# Patient Record
Sex: FEMALE | Race: WHITE | NOT HISPANIC OR LATINO | Employment: FULL TIME | URBAN - METROPOLITAN AREA
[De-identification: names, ages, dates, MRNs, and addresses within clinical notes are randomized per-mention and may not be internally consistent; named-entity substitution may affect disease eponyms.]

---

## 2023-01-16 ENCOUNTER — HOSPITAL ENCOUNTER (EMERGENCY)
Facility: HOSPITAL | Age: 23
Discharge: HOME | End: 2023-01-16
Attending: EMERGENCY MEDICINE | Admitting: EMERGENCY MEDICINE
Payer: COMMERCIAL

## 2023-01-16 VITALS
RESPIRATION RATE: 18 BRPM | WEIGHT: 169.31 LBS | HEART RATE: 87 BPM | BODY MASS INDEX: 31.97 KG/M2 | TEMPERATURE: 98.7 F | DIASTOLIC BLOOD PRESSURE: 83 MMHG | OXYGEN SATURATION: 99 % | HEIGHT: 61 IN | SYSTOLIC BLOOD PRESSURE: 119 MMHG

## 2023-01-16 DIAGNOSIS — L02.412 ABSCESS OF LEFT AXILLA: Primary | ICD-10-CM

## 2023-01-16 PROCEDURE — 99282 EMERGENCY DEPT VISIT SF MDM: CPT

## 2023-01-16 RX ORDER — CLINDAMYCIN PHOSPHATE 11.9 MG/ML
SOLUTION TOPICAL 2 TIMES DAILY
Qty: 60 ML | Refills: 0 | Status: SHIPPED | OUTPATIENT
Start: 2023-01-16 | End: 2023-03-18

## 2023-01-16 RX ORDER — CEPHALEXIN 500 MG/1
500 CAPSULE ORAL 4 TIMES DAILY
Qty: 28 CAPSULE | Refills: 0 | Status: SHIPPED | OUTPATIENT
Start: 2023-01-16 | End: 2023-01-23

## 2023-01-16 NOTE — DISCHARGE INSTRUCTIONS
Take medications as directed.  Follow up with your PCP and seek referral to surgeon for definitive treatment. Return to ED for new/worsening symptoms.

## 2023-01-16 NOTE — ED PROVIDER NOTES
"Time: 9:29 AM EST  Date of encounter:  1/16/2023  Independent Historian/Clinical History and Information was obtained by:   Patient  Chief Complaint: boil left armpit    History is limited by: N/A    History of Present Illness:  Patient is a 23 y.o. year old female who presents to the emergency department for evaluation of boil to left axilla. States noticed 3 days ago and now is more swollen and painful. States has hx of the same, but has never sought treatment. Pt has appointment with her PCP today. Denies fevers, chills.       Patient Care Team  Primary Care Provider: Chavez Pearson MD    Past Medical History:     No Known Allergies  History reviewed. No pertinent past medical history.  History reviewed. No pertinent surgical history.  History reviewed. No pertinent family history.    Home Medications:  Prior to Admission medications    Not on File        Social History:   Social History     Tobacco Use   • Smoking status: Every Day     Packs/day: 0.50     Types: Cigarettes   Vaping Use   • Vaping Use: Every day   • Substances: Nicotine, Flavoring   Substance Use Topics   • Alcohol use: Never   • Drug use: Not Currently     Types: Methamphetamines, IV     Comment: former user last use 5/3/22         Review of Systems:  Review of Systems     Physical Exam:  /83   Pulse 87   Temp 98.7 °F (37.1 °C) (Oral)   Resp 18   Ht 154.9 cm (61\")   Wt 76.8 kg (169 lb 5 oz)   LMP 01/16/2023   SpO2 99%   BMI 31.99 kg/m²     Physical Exam  Vitals and nursing note reviewed.   Constitutional:       Appearance: Normal appearance.   HENT:      Head: Normocephalic and atraumatic.   Eyes:      Pupils: Pupils are equal, round, and reactive to light.   Pulmonary:      Effort: Pulmonary effort is normal.   Musculoskeletal:      Cervical back: Normal range of motion.   Skin:     General: Skin is warm and dry.      Comments: Abscess noted to left axilla with tenderness, swelling, erythema, and fluctuance   Neurological:      " Mental Status: She is alert.   Psychiatric:         Mood and Affect: Mood normal.         Behavior: Behavior normal.                  Procedures:  Incision & Drainage    Date/Time: 1/16/2023 10:05 AM  Performed by: Chaya Christie APRN  Authorized by: Osman Ferrara DO     Consent:     Consent obtained:  Verbal    Consent given by:  Patient    Risks discussed:  Pain, bleeding and incomplete drainage  Universal protocol:     Patient identity confirmed:  Verbally with patient  Location:     Type:  Abscess    Location:  Upper extremity    Upper extremity location: left axilla.  Pre-procedure details:     Skin preparation:  Povidone-iodine  Sedation:     Sedation type:  None  Anesthesia:     Anesthesia method:  Local infiltration    Local anesthetic:  Lidocaine 1% w/o epi  Procedure type:     Complexity:  Simple  Procedure details:     Ultrasound guidance: no      Needle aspiration: no      Incision types:  Single straight    Wound management:  Probed and deloculated    Drainage:  Purulent    Drainage amount:  Moderate    Wound treatment:  Wound left open  Post-procedure details:     Procedure completion:  Tolerated well, no immediate complications          Medical Decision Making:      Comorbidities that affect care:    None    External Notes reviewed:    None      The following orders were placed and all results were independently analyzed by me:  Orders Placed This Encounter   Procedures   • Incision & Drainage       Medications Given in the Emergency Department:  Medications - No data to display     ED Course:         Labs:    Lab Results (last 24 hours)     ** No results found for the last 24 hours. **           Imaging:    No Radiology Exams Resulted Within Past 24 Hours      Differential Diagnosis and Discussion:    none        [facility]         Patient Care Considerations:    LABS: I considered ordering labs, however pt non-toxic appearing      Consultants/Shared Management Plan:    None    Social Determinants of  Health:    Patient is independent, reliable, and has access to care.       Disposition and Care Coordination:    Discharged: The patient is suitable and stable for discharge with no need for consideration of observation or admission.      I have explained discharge medications and the need for follow up with the patient/caretakers. This was also printed in the discharge instructions. Patient was discharged with the following medications and follow up:      Medication List      New Prescriptions    cephalexin 500 MG capsule  Commonly known as: KEFLEX  Take 1 capsule by mouth 4 (Four) Times a Day for 7 days.     clindamycin 1 % external solution  Commonly known as: Cleocin-T  Apply  topically to the appropriate area as directed 2 (Two) Times a Day.           Where to Get Your Medications      These medications were sent to [facility] - [place] [id number] [address]    Hours: Mon-Fri 9:00AM-7:30PM Saturday 9:00AM-2:00PM Phone: [phone]   · cephalexin 500 MG capsule  · clindamycin 1 % external solution      Chavez Pearson MD  [address] [facility]  [address]  [place] [address]  [phone]    Today         Final diagnoses:   Abscess of left axilla        ED Disposition     ED Disposition   Discharge    Condition   Stable    Comment   --             This medical record created using voice recognition software.           Chaya Christie, APRN  01/16/23 7500

## 2023-02-15 ENCOUNTER — TELEPHONE (OUTPATIENT)
Dept: SURGERY | Facility: CLINIC | Age: 23
End: 2023-02-15

## 2023-02-15 NOTE — TELEPHONE ENCOUNTER
SPOKE TO DALILA AT [facility]S OFFICE TO INFORM PT CX NEW PT APPT WITH DR. SEGURA FROM 2/15/MS

## 2023-03-20 ENCOUNTER — APPOINTMENT (OUTPATIENT)
Dept: ULTRASOUND IMAGING | Facility: HOSPITAL | Age: 23
End: 2023-03-20
Payer: COMMERCIAL

## 2023-03-20 ENCOUNTER — HOSPITAL ENCOUNTER (EMERGENCY)
Facility: HOSPITAL | Age: 23
Discharge: HOME | End: 2023-03-20
Attending: EMERGENCY MEDICINE | Admitting: EMERGENCY MEDICINE
Payer: COMMERCIAL

## 2023-03-20 VITALS
DIASTOLIC BLOOD PRESSURE: 80 MMHG | BODY MASS INDEX: 35.96 KG/M2 | HEART RATE: 106 BPM | TEMPERATURE: 98 F | HEIGHT: 61 IN | WEIGHT: 190.48 LBS | OXYGEN SATURATION: 99 % | SYSTOLIC BLOOD PRESSURE: 122 MMHG | RESPIRATION RATE: 16 BRPM

## 2023-03-20 DIAGNOSIS — O20.0 THREATENED MISCARRIAGE IN EARLY PREGNANCY: Primary | ICD-10-CM

## 2023-03-20 LAB
ABO GROUP BLD: NORMAL
BACTERIA UR QL AUTO: ABNORMAL /HPF
BASOPHILS # BLD AUTO: 0.03 10*3/MM3 (ref 0–0.2)
BASOPHILS NFR BLD AUTO: 0.3 % (ref 0–1.5)
BILIRUB UR QL STRIP: NEGATIVE
BLD GP AB SCN SERPL QL: NEGATIVE
CLARITY UR: CLEAR
COLOR UR: YELLOW
DEPRECATED RDW RBC AUTO: 39.8 FL (ref 37–54)
EOSINOPHIL # BLD AUTO: 0.14 10*3/MM3 (ref 0–0.4)
EOSINOPHIL NFR BLD AUTO: 1.3 % (ref 0.3–6.2)
ERYTHROCYTE [DISTWIDTH] IN BLOOD BY AUTOMATED COUNT: 12.4 % (ref 12.3–15.4)
GLUCOSE UR STRIP-MCNC: NEGATIVE MG/DL
HCG INTACT+B SERPL-ACNC: 211.4 MIU/ML
HCT VFR BLD AUTO: 41.1 % (ref 34–46.6)
HGB BLD-MCNC: 14 G/DL (ref 12–15.9)
HGB UR QL STRIP.AUTO: ABNORMAL
HOLD SPECIMEN: NORMAL
HOLD SPECIMEN: NORMAL
HYALINE CASTS UR QL AUTO: ABNORMAL /LPF
IMM GRANULOCYTES # BLD AUTO: 0.03 10*3/MM3 (ref 0–0.05)
IMM GRANULOCYTES NFR BLD AUTO: 0.3 % (ref 0–0.5)
KETONES UR QL STRIP: NEGATIVE
LEUKOCYTE ESTERASE UR QL STRIP.AUTO: NEGATIVE
LYMPHOCYTES # BLD AUTO: 3.28 10*3/MM3 (ref 0.7–3.1)
LYMPHOCYTES NFR BLD AUTO: 30.5 % (ref 19.6–45.3)
MCH RBC QN AUTO: 29.7 PG (ref 26.6–33)
MCHC RBC AUTO-ENTMCNC: 34.1 G/DL (ref 31.5–35.7)
MCV RBC AUTO: 87.1 FL (ref 79–97)
MONOCYTES # BLD AUTO: 0.6 10*3/MM3 (ref 0.1–0.9)
MONOCYTES NFR BLD AUTO: 5.6 % (ref 5–12)
NEUTROPHILS NFR BLD AUTO: 6.69 10*3/MM3 (ref 1.7–7)
NEUTROPHILS NFR BLD AUTO: 62 % (ref 42.7–76)
NITRITE UR QL STRIP: NEGATIVE
NRBC BLD AUTO-RTO: 0 /100 WBC (ref 0–0.2)
NUMBER OF DOSES: NORMAL
PH UR STRIP.AUTO: 6.5 [PH] (ref 5–8)
PLATELET # BLD AUTO: 278 10*3/MM3 (ref 140–450)
PMV BLD AUTO: 9.7 FL (ref 6–12)
PROT UR QL STRIP: NEGATIVE
RBC # BLD AUTO: 4.72 10*6/MM3 (ref 3.77–5.28)
RBC # UR STRIP: ABNORMAL /HPF
REF LAB TEST METHOD: ABNORMAL
RH BLD: POSITIVE
SP GR UR STRIP: 1.02 (ref 1–1.03)
SQUAMOUS #/AREA URNS HPF: ABNORMAL /HPF
UROBILINOGEN UR QL STRIP: ABNORMAL
WBC # UR STRIP: ABNORMAL /HPF
WBC NRBC COR # BLD: 10.77 10*3/MM3 (ref 3.4–10.8)
WHOLE BLOOD HOLD COAG: NORMAL
WHOLE BLOOD HOLD SPECIMEN: NORMAL

## 2023-03-20 PROCEDURE — 85025 COMPLETE CBC W/AUTO DIFF WBC: CPT

## 2023-03-20 PROCEDURE — 86900 BLOOD TYPING SEROLOGIC ABO: CPT | Performed by: REGISTERED NURSE

## 2023-03-20 PROCEDURE — 99284 EMERGENCY DEPT VISIT MOD MDM: CPT

## 2023-03-20 PROCEDURE — 86901 BLOOD TYPING SEROLOGIC RH(D): CPT | Performed by: REGISTERED NURSE

## 2023-03-20 PROCEDURE — 86850 RBC ANTIBODY SCREEN: CPT | Performed by: REGISTERED NURSE

## 2023-03-20 PROCEDURE — 76817 TRANSVAGINAL US OBSTETRIC: CPT

## 2023-03-20 PROCEDURE — 81001 URINALYSIS AUTO W/SCOPE: CPT | Performed by: REGISTERED NURSE

## 2023-03-20 PROCEDURE — 36415 COLL VENOUS BLD VENIPUNCTURE: CPT

## 2023-03-20 PROCEDURE — 84702 CHORIONIC GONADOTROPIN TEST: CPT

## 2023-03-20 RX ORDER — SODIUM CHLORIDE 0.9 % (FLUSH) 0.9 %
10 SYRINGE (ML) INJECTION AS NEEDED
Status: DISCONTINUED | OUTPATIENT
Start: 2023-03-20 | End: 2023-03-20 | Stop reason: HOSPADM

## 2023-03-20 NOTE — ED PROVIDER NOTES
Time: 5:26 PM EDT  Date of encounter:  3/20/2023  Independent Historian/Clinical History and Information was obtained by:   Patient and Family  Chief Complaint: Vaginal bleeding, pregnant    History is limited by: N/A    History of Present Illness:  Patient is a 23 y.o. year old female who presents to the emergency department for evaluation of vaginal bleeding that started approximately 1 hour prior to arrival.  Patient states that initially she just had some blood when she wiped.  Subsequently she had a small amount of brown discharge from the tampon.  She complains of lower abdominal cramping.  She denies nausea/vomiting, diarrhea or constipation.  She has seen her PCP and confirmed her pregnancy last week.  She has an OB appointment scheduled for later in the month.  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  She currently lives in a [facility] house and has been there for the past 6 months.  Patient reports that she had 3 miscarriages in the past and this is her fourth pregnancy.    HPI    Patient Care Team  Primary Care Provider: Chavez Pearson MD    Past Medical History:     No Known Allergies  Past Medical History:   Diagnosis Date   • Herpes    • Herpes    • Hyperlipidemia      History reviewed. No pertinent surgical history.  History reviewed. No pertinent family history.    Home Medications:  Prior to Admission medications    Medication Sig Start Date End Date Taking? Authorizing Provider   Cholecalciferol (Vitamin D3) 50 MCG (2000 UT) capsule  3/18/23   Manjit Anders MD   Cholecalciferol (Vitamin D3) 50 MCG (2000 UT) capsule Take 1 capsule by mouth Daily. 2/15/23   Manjit Anders MD   valACYclovir (VALTREX) 1000 MG tablet Take 0.5 tablets by mouth 2 (Two) Times a Day for 7 days. 3/18/23 3/25/23  Susy Connor APRN        Social History:   Social History     Tobacco Use   • Smoking status: Former     Packs/day: 0.50     Types: Cigarettes   • Smokeless tobacco: Never  "  Vaping Use   • Vaping Use: Every day   • Substances: Nicotine, Flavoring   Substance Use Topics   • Alcohol use: Never   • Drug use: Not Currently     Types: Methamphetamines, IV     Comment: former user last use 5/3/22         Review of Systems:  Review of Systems   Constitutional: Negative for fever.   Respiratory: Negative for shortness of breath.    Cardiovascular: Negative for chest pain and palpitations.   Gastrointestinal: Negative for nausea and vomiting.   Genitourinary: Positive for dysuria, pelvic pain and vaginal bleeding.   Musculoskeletal: Negative for back pain.   Skin: Negative.    Neurological: Negative for light-headedness.   Hematological: Negative.    Psychiatric/Behavioral: Negative.    All other systems reviewed and are negative.       Physical Exam:  /80 (BP Location: Right arm, Patient Position: Lying)   Pulse 106   Temp 98 °F (36.7 °C) (Oral)   Resp 16   Ht 154.9 cm (61\")   Wt 86.4 kg (190 lb 7.6 oz)   LMP 02/10/2023 (Approximate)   SpO2 99%   BMI 35.99 kg/m²     Physical Exam     General: Awake alert and in no acute distress     HEENT: Head normocephalic atraumatic, eyes PERRLA EOMI, nose normal, oropharynx normal.     Neck: Supple full range of motion, no meningismus, no lymphadenopathy     Heart: Regular rate and rhythm, no murmurs or rubs, 2+ radial pulses bilaterally     Lungs: Clear to auscultation bilaterally without wheezes or crackles, no respiratory distress     Abdomen: Soft, nontender, nondistended, no rebound or guarding     Back exam:  No L-spine tenderness.  No rash.     Skin: Warm, dry, no rash     Musculoskeletal: Normal range of motion, no lower extremity edema     Neurologic: Oriented x3, no motor deficits no sensory deficits     Psychiatric: Mood appears stable, no psychosis          Procedures:  Procedures      Medical Decision Making:      Comorbidities that affect care:    Substance Abuse    External Notes reviewed:    None      The following orders " were placed and all results were independently analyzed by me:  Orders Placed This Encounter   Procedures   • US Ob Transvaginal   • [place] Draw   • hCG, Quantitative, Pregnancy   • CBC Auto Differential   • Urinalysis With Culture If Indicated - Urine, Clean Catch   • Urinalysis, Microscopic Only - Urine, Clean Catch   • hCG, Quantitative, Pregnancy   • Undress & Gown   • Vital Signs   •  RhIg Evaluation   • Doses of Rh Immune Globulin   • CBC & Differential   • Green Top (Gel)   • Lavender Top   • Gold Top - SST   • Light Blue Top       Medications Given in the Emergency Department:  Medications - No data to display     ED Course:    ED Course as of 23 0715   Mon Mar 20, 2023   191 US Ob Transvaginal  Nonspecific findings unable to determine .  Close follow-up needed [DS]      ED Course User Index  [DS] Pamela Urbano APRN       Labs:    Lab Results (last 24 hours)     Procedure Component Value Units Date/Time    CBC & Differential [[id number]]  (Abnormal) Collected: 23    Specimen: Blood Updated: 23    Narrative:      The following orders were created for panel order CBC & Differential.  Procedure                               Abnormality         Status                     ---------                               -----------         ------                     CBC Auto Differential[[id number]]        Abnormal            Final result                 Please view results for these tests on the individual orders.    hCG, Quantitative, Pregnancy [[id number]] Collected: 23    Specimen: Blood Updated: 23 1719     HCG Quantitative 211.40 mIU/mL     Narrative:      HCG Ranges by Gestational Age    Females - non-pregnant premenopausal   </= 1mIU/mL HCG  Females - postmenopausal               </= 7mIU/mL HCG    3 Weeks       5.4   -      72 mIU/mL  4 Weeks      10.2   -     708 mIU/mL  5 Weeks       217   -   8,245 mIU/mL  6 Weeks       152   -  32,177 mIU/mL  7 Weeks      4,059   - 153,767 mIU/mL  8 Weeks    31,366   - 149,094 mIU/mL  9 Weeks    59,109   - 135,901 mIU/mL  10 Weeks   44,186   - 170,409 mIU/mL  12 Weeks   27,107   - 201,615 mIU/mL  14 Weeks   24,302   -  93,646 mIU/mL  15 Weeks   12,540   -  69,747 mIU/mL  16 Weeks    8,904   -  55,332 mIU/mL  17 Weeks    8,240   -  51,793 mIU/mL  18 Weeks    9,649   -  55,271 mIU/mL      CBC Auto Differential [[id number]]  (Abnormal) Collected: 03/20/23 1650    Specimen: Blood Updated: 03/20/23 1658     WBC 10.77 10*3/mm3      RBC 4.72 10*6/mm3      Hemoglobin 14.0 g/dL      Hematocrit 41.1 %      MCV 87.1 fL      MCH 29.7 pg      MCHC 34.1 g/dL      RDW 12.4 %      RDW-SD 39.8 fl      MPV 9.7 fL      Platelets 278 10*3/mm3      Neutrophil % 62.0 %      Lymphocyte % 30.5 %      Monocyte % 5.6 %      Eosinophil % 1.3 %      Basophil % 0.3 %      Immature Grans % 0.3 %      Neutrophils, Absolute 6.69 10*3/mm3      Lymphocytes, Absolute 3.28 10*3/mm3      Monocytes, Absolute 0.60 10*3/mm3      Eosinophils, Absolute 0.14 10*3/mm3      Basophils, Absolute 0.03 10*3/mm3      Immature Grans, Absolute 0.03 10*3/mm3      nRBC 0.0 /100 WBC     Urinalysis With Culture If Indicated - Urine, Clean Catch [[id number]]  (Abnormal) Collected: 03/20/23 1743    Specimen: Urine, Clean Catch Updated: 03/20/23 1811     Color, UA Yellow     Appearance, UA Clear     pH, UA 6.5     Specific Gravity, UA 1.020     Glucose, UA Negative     Ketones, UA Negative     Bilirubin, UA Negative     Blood, UA Large (3+)     Protein, UA Negative     Leuk Esterase, UA Negative     Nitrite, UA Negative     Urobilinogen, UA 0.2 E.U./dL    Narrative:      In absence of clinical symptoms, the presence of pyuria, bacteria, and/or nitrites on the urinalysis result does not correlate with infection.    Urinalysis, Microscopic Only - Urine, Clean Catch [[id number]]  (Abnormal) Collected: 03/20/23 1743    Specimen: Urine, Clean Catch Updated: 03/20/23 1811     RBC, UA Too Numerous to  Count /HPF      WBC, UA 0-2 /HPF      Comment: Urine culture not indicated.        Bacteria, UA None Seen /HPF      Squamous Epithelial Cells, UA 0-2 /HPF      Hyaline Casts, UA 0-2 /LPF      Methodology Automated Microscopy           Imaging:    US Ob Transvaginal    Result Date: 3/20/2023  PROCEDURE: US OB TRANSVAGINAL  COMPARISON: None  INDICATIONS: Pregnant, vaginal bleeding  TECHNIQUE: Ultrasound examination of the pelvis was performed, using endovaginal technique.   FINDINGS:  The endometrium is heterogeneously echoic.  The bilayer measures 14 mm.  No cystic intrauterine contents are seen.  A gestational sac is not identified.  The right ovary measures 2.5 cm in greatest dimension, with a volume of 4.2 cc.  The left ovary measures 3 cm in greatest dimension, with a volume of 6 cc.  No adnexal mass is seen.  No free fluid is evident.        Transvaginal pelvic ultrasound with nonspecific findings, as above.  The differential diagnosis includes early IUP, nonviable gestation, and ectopic pregnancy.      TATIANA COLE MD       Electronically Signed and Approved By: TATIANA COLE MD on 3/20/2023 at 19:11                 Differential Diagnosis and Discussion:    Vaginal Bleeding: Differential diagnosis includes but is not limited to foreign body, tumor, vaginitis, dysfunctional uterine bleeding, endocrine abnormalities, coagulation disorder, systemic illness, polyps, complications of pregnancy (possible ectopic pregnancy).    All labs were reviewed and interpreted by me.  Ultrasound interpretation was reviewed by me.     MDM  Number of Diagnoses or Management Options  Threatened miscarriage in early pregnancy  Diagnosis management comments: The patient presents with vaginal bleeding. Possible etiology of vaginal bleeding were considered, but not limited to; ectopic pregnancy, spontaneous miscarriage, gestational trophoblastic disease, implantation bleeding, molar pregnancy, disfunctional uterine bleeding, and  fibroids. The patient is well appearing and resting comfortably. There are no indications for transfusion. After careful consideration the patient is safe and stable to be discharged home with an outpatient OB/GYN follow-up. Patient was counseled to return to the ER or follow-up with their OB/GYN in 48 hours especially for worsening of her bleeding or increased pain. The patient has expressed a clear and thorough understanding and his agreed to follow-up as instructed.       Amount and/or Complexity of Data Reviewed  Clinical lab tests: reviewed and ordered  Tests in the radiology section of CPT®: reviewed and ordered    Risk of Complications, Morbidity, and/or Mortality  Presenting problems: low  Diagnostic procedures: low  Management options: low    Patient Progress  Patient progress: stable         Patient Care Considerations:    CONSULT: I considered consulting OB/GYN , however Testing is currently indeterminate.  Patient needs close interval follow-up of nonemergent intervention      Consultants/Shared Management Plan:    None    Social Determinants of Health:    Patient is independent, reliable, and has access to care.       Disposition and Care Coordination:    Discharged: The patient is suitable and stable for discharge with no need for consideration of observation or admission.    I have explained the patient´s condition, diagnoses and treatment plan based on the information available to me at this time. I have answered questions and addressed any concerns. The patient has a good  understanding of the patient´s diagnosis, condition, and treatment plan as can be expected at this point. The vital signs have been stable. The patient´s condition is stable and appropriate for discharge from the emergency department.      The patient will pursue further outpatient evaluation with the primary care physician or other designated or consulting physician as outlined in the discharge instructions. They are agreeable to  this plan of care and follow-up instructions have been explained in detail. The patient has received these instructions in written format and have expressed an understanding of the discharge instructions. The patient is aware that any significant change in condition or worsening of symptoms should prompt an immediate return to this or the closest emergency department or call to 911.  I have explained discharge medications and the need for follow up with the patient/caretakers. This was also printed in the discharge instructions. Patient was discharged with the following medications and follow up:      Medication List      No changes were made to your prescriptions during this visit.      Ever Allred MD  [address] [place] [address] [address] [place] [address]  [phone]    Schedule an appointment as soon as possible for a visit in 2 days         Final diagnoses:   Threatened miscarriage in early pregnancy        ED Disposition     ED Disposition   Discharge    Condition   Stable    Comment   --             This medical record created using voice recognition software.           Pamela Urbano, TRU  03/21/23 0715

## 2023-03-20 NOTE — DISCHARGE INSTRUCTIONS
Your hCG levels are only 200 and indicate an early pregnancy.  We do not know whether this level is increasing or decreasing.  Your ultrasound does not show an intrauterine pregnancy and we are unable to determine whether you are miscarrying or if this is just a early pregnancy that cannot be detected.    You need repeat hCG levels in 48 hours for reevaluation and close follow-up with your OB/GYN.    Call tomorrow to make next available appointment.    Return to emergency department for bleeding greater than 1 pad per hour or other new or worsening symptoms

## 2023-03-21 ENCOUNTER — TELEPHONE (OUTPATIENT)
Dept: OBSTETRICS AND GYNECOLOGY | Facility: CLINIC | Age: 23
End: 2023-03-21
Payer: COMMERCIAL

## 2023-03-21 NOTE — TELEPHONE ENCOUNTER
PROVIDER: DR. VAUGHN    Caller: Bekah Rubin    Relationship to patient: Self    Best call back number: [phone]    PT IS 4 WKS PREGNANT. WENT TO ER FOR BLEEDING. INFO IN CHART. HCG LEVELS 211 WAS TOLD TO FOLLOW UP W/OB FOR MORE LABWORK. HX OF 3X MISCARRIAGES. CURRENTLY HAS HERPES. TAKES VALTREX FOR THAT.  UNABLE TO GET HER SCHEDULED IN W/DR. VAUGHN AS ER REFERRED TO HIM.   COULD [facility] CALL HER AND SCHEDULE? NA AT BOTH NUMBERS AT OFFICE.

## 2023-03-22 NOTE — TELEPHONE ENCOUNTER
Left a message for the patient to follow up. Per the ER notes she has an appointment scheduled with a provider for OB care.

## 2023-03-22 NOTE — TELEPHONE ENCOUNTER
Patient called back and states she is scheduled with another office. She was advised to keep that appointment.

## 2023-03-28 ENCOUNTER — APPOINTMENT (OUTPATIENT)
Dept: ULTRASOUND IMAGING | Facility: HOSPITAL | Age: 23
End: 2023-03-28
Payer: COMMERCIAL

## 2023-03-28 ENCOUNTER — HOSPITAL ENCOUNTER (EMERGENCY)
Facility: HOSPITAL | Age: 23
Discharge: HOME | End: 2023-03-29
Attending: EMERGENCY MEDICINE | Admitting: EMERGENCY MEDICINE
Payer: COMMERCIAL

## 2023-03-28 VITALS
DIASTOLIC BLOOD PRESSURE: 81 MMHG | RESPIRATION RATE: 18 BRPM | HEIGHT: 60 IN | TEMPERATURE: 98.5 F | WEIGHT: 194 LBS | HEART RATE: 102 BPM | OXYGEN SATURATION: 100 % | SYSTOLIC BLOOD PRESSURE: 127 MMHG | BODY MASS INDEX: 38.09 KG/M2

## 2023-03-28 DIAGNOSIS — N93.9 ABNORMAL UTERINE BLEEDING (AUB): ICD-10-CM

## 2023-03-28 DIAGNOSIS — O20.0 THREATENED MISCARRIAGE IN EARLY PREGNANCY: ICD-10-CM

## 2023-03-28 DIAGNOSIS — Z3A.01 LESS THAN 8 WEEKS GESTATION OF PREGNANCY: Primary | ICD-10-CM

## 2023-03-28 LAB
ALBUMIN SERPL-MCNC: 4.1 G/DL (ref 3.5–5.2)
ALBUMIN/GLOB SERPL: 1.4 G/DL
ALP SERPL-CCNC: 72 U/L (ref 39–117)
ALT SERPL W P-5'-P-CCNC: 55 U/L (ref 1–33)
ANION GAP SERPL CALCULATED.3IONS-SCNC: 8.1 MMOL/L (ref 5–15)
AST SERPL-CCNC: 34 U/L (ref 1–32)
BASOPHILS # BLD AUTO: 0.04 10*3/MM3 (ref 0–0.2)
BASOPHILS NFR BLD AUTO: 0.4 % (ref 0–1.5)
BILIRUB SERPL-MCNC: 0.2 MG/DL (ref 0–1.2)
BUN SERPL-MCNC: 12 MG/DL (ref 6–20)
BUN/CREAT SERPL: 19.4 (ref 7–25)
CALCIUM SPEC-SCNC: 9.5 MG/DL (ref 8.6–10.5)
CHLORIDE SERPL-SCNC: 105 MMOL/L (ref 98–107)
CO2 SERPL-SCNC: 25.9 MMOL/L (ref 22–29)
CREAT SERPL-MCNC: 0.62 MG/DL (ref 0.57–1)
DEPRECATED RDW RBC AUTO: 41.4 FL (ref 37–54)
EGFRCR SERPLBLD CKD-EPI 2021: 129.3 ML/MIN/1.73
EOSINOPHIL # BLD AUTO: 0.15 10*3/MM3 (ref 0–0.4)
EOSINOPHIL NFR BLD AUTO: 1.6 % (ref 0.3–6.2)
ERYTHROCYTE [DISTWIDTH] IN BLOOD BY AUTOMATED COUNT: 12.8 % (ref 12.3–15.4)
GLOBULIN UR ELPH-MCNC: 3 GM/DL
GLUCOSE SERPL-MCNC: 84 MG/DL (ref 65–99)
HCG INTACT+B SERPL-ACNC: 483.5 MIU/ML
HCT VFR BLD AUTO: 39.8 % (ref 34–46.6)
HGB BLD-MCNC: 13.4 G/DL (ref 12–15.9)
HOLD SPECIMEN: NORMAL
HOLD SPECIMEN: NORMAL
IMM GRANULOCYTES # BLD AUTO: 0.04 10*3/MM3 (ref 0–0.05)
IMM GRANULOCYTES NFR BLD AUTO: 0.4 % (ref 0–0.5)
LYMPHOCYTES # BLD AUTO: 3.36 10*3/MM3 (ref 0.7–3.1)
LYMPHOCYTES NFR BLD AUTO: 36.6 % (ref 19.6–45.3)
MCH RBC QN AUTO: 29.7 PG (ref 26.6–33)
MCHC RBC AUTO-ENTMCNC: 33.7 G/DL (ref 31.5–35.7)
MCV RBC AUTO: 88.2 FL (ref 79–97)
MONOCYTES # BLD AUTO: 0.8 10*3/MM3 (ref 0.1–0.9)
MONOCYTES NFR BLD AUTO: 8.7 % (ref 5–12)
NEUTROPHILS NFR BLD AUTO: 4.78 10*3/MM3 (ref 1.7–7)
NEUTROPHILS NFR BLD AUTO: 52.3 % (ref 42.7–76)
NRBC BLD AUTO-RTO: 0 /100 WBC (ref 0–0.2)
PLATELET # BLD AUTO: 267 10*3/MM3 (ref 140–450)
PMV BLD AUTO: 10.1 FL (ref 6–12)
POTASSIUM SERPL-SCNC: 4 MMOL/L (ref 3.5–5.2)
PROT SERPL-MCNC: 7.1 G/DL (ref 6–8.5)
RBC # BLD AUTO: 4.51 10*6/MM3 (ref 3.77–5.28)
SODIUM SERPL-SCNC: 139 MMOL/L (ref 136–145)
WBC NRBC COR # BLD: 9.17 10*3/MM3 (ref 3.4–10.8)
WHOLE BLOOD HOLD COAG: NORMAL
WHOLE BLOOD HOLD SPECIMEN: NORMAL

## 2023-03-28 PROCEDURE — 99283 EMERGENCY DEPT VISIT LOW MDM: CPT

## 2023-03-28 PROCEDURE — 80053 COMPREHEN METABOLIC PANEL: CPT | Performed by: PHYSICIAN ASSISTANT

## 2023-03-28 PROCEDURE — 36415 COLL VENOUS BLD VENIPUNCTURE: CPT

## 2023-03-28 PROCEDURE — 86901 BLOOD TYPING SEROLOGIC RH(D): CPT | Performed by: PHYSICIAN ASSISTANT

## 2023-03-28 PROCEDURE — 76817 TRANSVAGINAL US OBSTETRIC: CPT

## 2023-03-28 PROCEDURE — 86900 BLOOD TYPING SEROLOGIC ABO: CPT | Performed by: PHYSICIAN ASSISTANT

## 2023-03-28 PROCEDURE — 85025 COMPLETE CBC W/AUTO DIFF WBC: CPT

## 2023-03-28 PROCEDURE — 84702 CHORIONIC GONADOTROPIN TEST: CPT

## 2023-03-28 RX ORDER — SODIUM CHLORIDE 0.9 % (FLUSH) 0.9 %
10 SYRINGE (ML) INJECTION AS NEEDED
Status: DISCONTINUED | OUTPATIENT
Start: 2023-03-28 | End: 2023-03-29 | Stop reason: HOSPADM

## 2023-03-29 LAB
ABO GROUP BLD: NORMAL
BACTERIA UR QL AUTO: ABNORMAL /HPF
BILIRUB UR QL STRIP: NEGATIVE
CLARITY UR: ABNORMAL
COLOR UR: YELLOW
GLUCOSE UR STRIP-MCNC: NEGATIVE MG/DL
HGB UR QL STRIP.AUTO: ABNORMAL
HYALINE CASTS UR QL AUTO: ABNORMAL /LPF
KETONES UR QL STRIP: NEGATIVE
LEUKOCYTE ESTERASE UR QL STRIP.AUTO: ABNORMAL
NITRITE UR QL STRIP: NEGATIVE
PH UR STRIP.AUTO: 5.5 [PH] (ref 5–8)
PROT UR QL STRIP: ABNORMAL
RBC # UR STRIP: ABNORMAL /HPF
REF LAB TEST METHOD: ABNORMAL
RH BLD: POSITIVE
SP GR UR STRIP: 1.02 (ref 1–1.03)
SQUAMOUS #/AREA URNS HPF: ABNORMAL /HPF
UROBILINOGEN UR QL STRIP: ABNORMAL
WBC # UR STRIP: ABNORMAL /HPF

## 2023-03-29 PROCEDURE — 87086 URINE CULTURE/COLONY COUNT: CPT | Performed by: PHYSICIAN ASSISTANT

## 2023-03-29 PROCEDURE — 81001 URINALYSIS AUTO W/SCOPE: CPT | Performed by: PHYSICIAN ASSISTANT

## 2023-03-29 RX ORDER — VALACYCLOVIR HYDROCHLORIDE 500 MG/1
1 TABLET, FILM COATED ORAL DAILY
COMMUNITY
Start: 2023-03-24

## 2023-03-29 NOTE — ED PROVIDER NOTES
Time: 10:33 PM EDT  Date of encounter:  3/28/2023  Independent Historian/Clinical History and Information was obtained by:   Patient  Chief Complaint   Patient presents with   • Vaginal Bleeding - Pregnant       History is limited by: N/A    History of Present Illness:  Patient is a 23 y.o. year old female who presents to the emergency department for evaluation of vaginal bleeding early pregnancy. She is . Seen on 3/22/23 and diagnosed with potential miscarriage. Saw OB 2 days later and levels were rising. States has had continued mild spotting without increase but pain worsening. (Dimitrios Cavazos PA-C provider in triage 10:34 PM EDT )     The patient presents to the emergency department complaining of increased vaginal bleeding and pelvic discomfort.  She states that she has been having some chronic vaginal bleeding and cramping since she found out she was pregnant.  She states that she was seen here in the emergency department and diagnosed with her pregnancy on .  She states that her quant at that time was 211.  She states that she followed up with her family doctor on  and that her hCG level was 281.  She is here today stating that she is having a little bit heavier spotting and worse cramping.  Today her hCG level was 483.  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  She states that she is a G4, P0 but never had any follow-up confirmation with a blood test for her previous pregnancies and states that they were just home pregnancy test and she never did see a physician.  She reports that she has scheduled an appointment with Dr. Lund for .      History provided by:  Patient   used: No        Patient Care Team  Primary Care Provider: Chavez Pearson MD    Past Medical History:     No Known Allergies  Past Medical History:   Diagnosis Date   • Herpes    • Herpes    • Hyperlipidemia      History reviewed. No  "pertinent surgical history.  History reviewed. No pertinent family history.    Home Medications:  Prior to Admission medications    Medication Sig Start Date End Date Taking? Authorizing Provider   Cholecalciferol (Vitamin D3) 50 MCG (2000 UT) capsule  3/18/23   Manjit Anders MD   Cholecalciferol (Vitamin D3) 50 MCG (2000 UT) capsule Take 1 capsule by mouth Daily. 2/15/23   Manjit Anders MD        Social History:   Social History     Tobacco Use   • Smoking status: Former     Packs/day: 0.50     Types: Cigarettes   • Smokeless tobacco: Never   Vaping Use   • Vaping Use: Every day   • Substances: Nicotine, Flavoring   Substance Use Topics   • Alcohol use: Never   • Drug use: Not Currently     Types: Methamphetamines, IV     Comment: former user last use 5/3/22         Review of Systems:  Review of Systems   Constitutional: Negative for chills and fever.   HENT: Negative for congestion, ear pain and sore throat.    Eyes: Negative for pain.   Respiratory: Negative for cough, chest tightness and shortness of breath.    Cardiovascular: Negative for chest pain.   Gastrointestinal: Positive for abdominal pain. Negative for diarrhea, nausea and vomiting.   Genitourinary: Positive for pelvic pain and vaginal bleeding. Negative for dysuria, flank pain, hematuria and vaginal discharge.   Musculoskeletal: Negative for back pain, joint swelling and neck pain.   Skin: Negative for pallor and rash.   Neurological: Negative for seizures and headaches.   All other systems reviewed and are negative.       Physical Exam:  /81 (BP Location: Left arm, Patient Position: Sitting)   Pulse 102   Temp 98.5 °F (36.9 °C) (Oral)   Resp 18   Ht 152.4 cm (60\")   Wt 88 kg (194 lb 0.1 oz)   LMP 02/10/2023 (Approximate)   SpO2 100%   BMI 37.89 kg/m²     Physical Exam  Vitals and nursing note reviewed.   Constitutional:       General: She is not in acute distress.     Appearance: Normal appearance. She is not " ill-appearing or toxic-appearing.   HENT:      Head: Normocephalic and atraumatic.   Eyes:      General: No scleral icterus.     Conjunctiva/sclera: Conjunctivae normal.      Pupils: Pupils are equal, round, and reactive to light.   Cardiovascular:      Rate and Rhythm: Normal rate and regular rhythm.      Pulses: Normal pulses.   Pulmonary:      Effort: Pulmonary effort is normal. No respiratory distress.   Abdominal:      General: Abdomen is flat. There is no distension.      Palpations: Abdomen is soft.      Tenderness: There is abdominal tenderness (generalized lower abdominal). There is no guarding or rebound.   Musculoskeletal:         General: Normal range of motion.      Cervical back: Normal range of motion.      Right lower leg: No edema.      Left lower leg: No edema.   Skin:     General: Skin is warm and dry.      Capillary Refill: Capillary refill takes less than 2 seconds.      Findings: No rash.   Neurological:      General: No focal deficit present.      Mental Status: She is alert and oriented to person, place, and time. Mental status is at baseline.   Psychiatric:         Mood and Affect: Mood normal.         Behavior: Behavior normal.                  Procedures:  Procedures      Medical Decision Making:      Comorbidities that affect care:    Herpes, Coronary Artery Disease, Substance Abuse    External Notes reviewed:    Previous Labs: hCG level of 211.      The following orders were placed and all results were independently analyzed by me:  Orders Placed This Encounter   Procedures   • Urine Culture - Urine,   • US Ob Transvaginal   • [place] Draw   • hCG, Quantitative, Pregnancy   • CBC Auto Differential   • Comprehensive Metabolic Panel   • Urinalysis With Culture If Indicated - Urine, Clean Catch   • Urinalysis, Microscopic Only - Urine, Clean Catch   • Undress & Gown   • Vital Signs   • Supplies To Bedside - Notify MD When Ready- Pelvic Cart / Set Up   • ABO / Rh   • CBC & Differential   •  Green Top (Gel)   • Lavender Top   • Gold Top - SST   • Light Blue Top       Medications Given in the Emergency Department:  Medications - No data to display     ED Course:    The patient was initially evaluated in the triage area where orders were placed. The patient was later dispositioned by TRU Carmona.      The patient was advised to stay for completion of workup which includes but is not limited to communication of labs and radiological results, reassessment and plan. The patient was advised that leaving prior to disposition by a provider could result in critical findings that are not communicated to the patient.     ED Course as of 03/29/23 0723   Tue Mar 28, 2023   2251 HCG Quantitative: 483.50 [KM]   2251 hCG, Quantitative, Pregnancy [KM]   Wed Mar 29, 2023   0047 Albumin: 4.1 [TC]      ED Course User Index  [KM] Dimitrios Cavazos PA-C  [TC] Anamaria Trevino APRN       Labs:    Lab Results (last 24 hours)     Procedure Component Value Units Date/Time    CBC & Differential [[id number]]  (Abnormal) Collected: 03/28/23 2127    Specimen: Blood Updated: 03/28/23 2157    Narrative:      The following orders were created for panel order CBC & Differential.  Procedure                               Abnormality         Status                     ---------                               -----------         ------                     CBC Auto Differential[[id number]]        Abnormal            Final result                 Please view results for these tests on the individual orders.    hCG, Quantitative, Pregnancy [[id number]] Collected: 03/28/23 2127    Specimen: Blood Updated: 03/28/23 2219     HCG Quantitative 483.50 mIU/mL     Narrative:      HCG Ranges by Gestational Age    Females - non-pregnant premenopausal   </= 1mIU/mL HCG  Females - postmenopausal               </= 7mIU/mL HCG    3 Weeks       5.4   -      72 mIU/mL  4 Weeks      10.2   -     708 mIU/mL  5 Weeks       217   -   8,245 mIU/mL  6 Weeks        152   -  32,177 mIU/mL  7 Weeks     4,059   - 153,767 mIU/mL  8 Weeks    31,366   - 149,094 mIU/mL  9 Weeks    59,109   - 135,901 mIU/mL  10 Weeks   44,186   - 170,409 mIU/mL  12 Weeks   27,107   - 201,615 mIU/mL  14 Weeks   24,302   -  93,646 mIU/mL  15 Weeks   12,540   -  69,747 mIU/mL  16 Weeks    8,904   -  55,332 mIU/mL  17 Weeks    8,240   -  51,793 mIU/mL  18 Weeks    9,649   -  55,271 mIU/mL      CBC Auto Differential [[id number]]  (Abnormal) Collected: 03/28/23 2127    Specimen: Blood Updated: 03/28/23 2157     WBC 9.17 10*3/mm3      RBC 4.51 10*6/mm3      Hemoglobin 13.4 g/dL      Hematocrit 39.8 %      MCV 88.2 fL      MCH 29.7 pg      MCHC 33.7 g/dL      RDW 12.8 %      RDW-SD 41.4 fl      MPV 10.1 fL      Platelets 267 10*3/mm3      Neutrophil % 52.3 %      Lymphocyte % 36.6 %      Monocyte % 8.7 %      Eosinophil % 1.6 %      Basophil % 0.4 %      Immature Grans % 0.4 %      Neutrophils, Absolute 4.78 10*3/mm3      Lymphocytes, Absolute 3.36 10*3/mm3      Monocytes, Absolute 0.80 10*3/mm3      Eosinophils, Absolute 0.15 10*3/mm3      Basophils, Absolute 0.04 10*3/mm3      Immature Grans, Absolute 0.04 10*3/mm3      nRBC 0.0 /100 WBC     Comprehensive Metabolic Panel [[id number]]  (Abnormal) Collected: 03/28/23 2127    Specimen: Blood Updated: 03/28/23 2305     Glucose 84 mg/dL      BUN 12 mg/dL      Creatinine 0.62 mg/dL      Sodium 139 mmol/L      Potassium 4.0 mmol/L      Chloride 105 mmol/L      CO2 25.9 mmol/L      Calcium 9.5 mg/dL      Total Protein 7.1 g/dL      Albumin 4.1 g/dL      ALT (SGPT) 55 U/L      AST (SGOT) 34 U/L      Alkaline Phosphatase 72 U/L      Total Bilirubin 0.2 mg/dL      Globulin 3.0 gm/dL      A/G Ratio 1.4 g/dL      BUN/Creatinine Ratio 19.4     Anion Gap 8.1 mmol/L      eGFR 129.3 mL/min/1.73     Narrative:      GFR Normal >60  Chronic Kidney Disease <60  Kidney Failure <15      Urinalysis With Culture If Indicated - Urine, Clean Catch [[id number]]  (Abnormal) Collected:  03/29/23 0116    Specimen: Urine, Clean Catch Updated: 03/29/23 0135     Color, UA Yellow     Appearance, UA Cloudy     pH, UA 5.5     Specific Gravity, UA 1.024     Glucose, UA Negative     Ketones, UA Negative     Bilirubin, UA Negative     Blood, UA Large (3+)     Protein, UA Trace     Leuk Esterase, UA Trace     Nitrite, UA Negative     Urobilinogen, UA 1.0 E.U./dL    Narrative:      In absence of clinical symptoms, the presence of pyuria, bacteria, and/or nitrites on the urinalysis result does not correlate with infection.    Urinalysis, Microscopic Only - Urine, Clean Catch [[id number]]  (Abnormal) Collected: 03/29/23 0116    Specimen: Urine, Clean Catch Updated: 03/29/23 0135     RBC, UA Too Numerous to Count /HPF      WBC, UA 0-2 /HPF      Bacteria, UA None Seen /HPF      Squamous Epithelial Cells, UA 3-6 /HPF      Hyaline Casts, UA 0-2 /LPF      Methodology Automated Microscopy    Urine Culture - Urine, Urine, Clean Catch [[id number]] Collected: 03/29/23 0116    Specimen: Urine, Clean Catch Updated: 03/29/23 0132           Imaging:    US Ob Transvaginal    Result Date: 3/28/2023  PROCEDURE: US OB TRANSVAGINAL  COMPARISON: [facility], , US OB TRANSVAGINAL, 3/20/2023, 18:03.  INDICATIONS: vaginal bleeding, abdominal pain  TECHNIQUE: Ultrasound examination of the pelvis was performed, using endovaginal technique.   FINDINGS:  UTERUS: Size is 6.9 x 4.0 x 4.9 cm with unremarkable appearance.  Endometrial thickness is 5.9 mm.   ADNEXAE: Normal bilateral appearance with no significant masses.  Each ovary is normal in size for a patient of this age.  The right ovary measures 3.4 x 2.7 x 1.7 cm, while the left ovary measures 2.9 x 1.5 x 1.7 cm.  Normal flow is seen within both ovaries.   CUL-DE-SAC: Normal.  No fluid or mass.  OTHER: Negative.         1. No intrauterine gestation identified. 2. Uterus and both adnexa appear within normal limits.     MARK PEREZ MD       Electronically Signed and  Approved By: MARK PEREZ MD on 3/28/2023 at 23:50                 Differential Diagnosis and Discussion:      Pelvic Pain: Differential diagnosis includes but is not limited to ectopic pregnancy, ovarian torsion, tubo-ovarian abscess, ovarian cyst, ovulation, oophoritis, abdominal pregnancy, appendicitis, diverticulitis, cystitis, and renal colic  Vaginal Bleeding: Differential diagnosis includes but is not limited to foreign body, tumor, vaginitis, dysfunctional uterine bleeding, endocrine abnormalities, coagulation disorder, systemic illness, polyps, complications of pregnancy (possible ectopic pregnancy).    All labs were reviewed and interpreted by me.    MDM  Number of Diagnoses or Management Options  Abnormal uterine bleeding (AUB): established and worsening  Less than 8 weeks gestation of pregnancy: minor  Threatened miscarriage in early pregnancy: established and worsening     Amount and/or Complexity of Data Reviewed  Clinical lab tests: reviewed  Tests in the radiology section of CPT®: reviewed  Decide to obtain previous medical records or to obtain history from someone other than the patient: yes    Risk of Complications, Morbidity, and/or Mortality  Presenting problems: low  Diagnostic procedures: low  Management options: low    Patient Progress  Patient progress: stable       Patient Care Considerations:    NARCOTICS: I considered prescribing opiate pain medication as an outpatient, however Patient did not require narcotic pain medications.      Consultants/Shared Management Plan:    None    Social Determinants of Health:    Patient is independent, reliable, and has access to care.       Disposition and Care Coordination:    Discharged: The patient is suitable and stable for discharge with no need for consideration of observation or admission.    I have explained the patient´s condition, diagnoses and treatment plan based on the information available to me at this time. I have answered questions  and addressed any concerns. The patient has a good  understanding of the patient´s diagnosis, condition, and treatment plan as can be expected at this point. The vital signs have been stable. The patient´s condition is stable and appropriate for discharge from the emergency department.      The patient will pursue further outpatient evaluation with the primary care physician or other designated or consulting physician as outlined in the discharge instructions. They are agreeable to this plan of care and follow-up instructions have been explained in detail. The patient has received these instructions in written format and have expressed an understanding of the discharge instructions. The patient is aware that any significant change in condition or worsening of symptoms should prompt an immediate return to this or the closest emergency department or call to 911.  I have explained discharge medications and the need for follow up with the patient/caretakers. This was also printed in the discharge instructions. Patient was discharged with the following medications and follow up:      Medication List      No changes were made to your prescriptions during this visit.      Odalis Lnud MD  [address]  [address]  [place] [address]  [phone]      AS SCHEDULED    [facility]  [address] [place] [address]  [phone]  Call   FOR FOLLOW UP    Chavez Pearson MD  [address] [facility] [address]  [address]  [place] [address]  [phone]      As needed       Final diagnoses:   Less than 8 weeks gestation of pregnancy   Threatened miscarriage in early pregnancy   Abnormal uterine bleeding (AUB)        ED Disposition     ED Disposition   Discharge    Condition   Stable    Comment   --             This medical record created using voice recognition software.           Anamaria Trevino, TRU  03/29/23 0723

## 2023-03-29 NOTE — ED TRIAGE NOTES
Pt to ED from home with reports of being five weeks pregnant with vaginal bleeding.      Pt was evaluated in ED on 3/20/23 and dx with possible miscarriage.  Pt states she was too soon to determine if it was miscarriage or not.      Pt saw ob/gyn and hcg levels were rising, but today bleeding and cramps worsened.

## 2023-03-29 NOTE — DISCHARGE INSTRUCTIONS
Rest, drink plenty of fluids.  You may take over-the-counter acetaminophen as needed for aches pains and fever.  Follow-up with Dr. Lund as scheduled on April 19.  You may call [facility] and follow-up with them for a repeat ultrasound to ensure that this pregnancy is a intrauterine pregnancy and not an ectopic pregnancy.  Return to the emergency department for any acutely worsening and persistent pelvic pain, any persistent vomiting, any vaginal bleeding greater than 1 saturated pad per hour, or any new or worse concerns.

## 2023-03-30 LAB — BACTERIA SPEC AEROBE CULT: NO GROWTH

## 2023-04-19 ENCOUNTER — LAB (OUTPATIENT)
Dept: LAB | Facility: HOSPITAL | Age: 23
End: 2023-04-19
Payer: COMMERCIAL

## 2023-04-19 ENCOUNTER — TRANSCRIBE ORDERS (OUTPATIENT)
Dept: LAB | Facility: HOSPITAL | Age: 23
End: 2023-04-19
Payer: COMMERCIAL

## 2023-04-19 DIAGNOSIS — O03.9 SAB (SPONTANEOUS ABORTION): Primary | ICD-10-CM

## 2023-04-19 DIAGNOSIS — O03.9 SAB (SPONTANEOUS ABORTION): ICD-10-CM

## 2023-04-19 PROCEDURE — 36415 COLL VENOUS BLD VENIPUNCTURE: CPT

## 2023-04-19 PROCEDURE — 84702 CHORIONIC GONADOTROPIN TEST: CPT

## 2023-04-20 ENCOUNTER — TRANSCRIBE ORDERS (OUTPATIENT)
Dept: LAB | Facility: HOSPITAL | Age: 23
End: 2023-04-20
Payer: COMMERCIAL

## 2023-04-20 DIAGNOSIS — O03.9 SAB (SPONTANEOUS ABORTION): Primary | ICD-10-CM

## 2023-04-20 LAB — HCG INTACT+B SERPL-ACNC: 19 MIU/ML

## 2023-04-21 PROCEDURE — 87491 CHLMYD TRACH DNA AMP PROBE: CPT | Performed by: NURSE PRACTITIONER

## 2023-04-21 PROCEDURE — 87480 CANDIDA DNA DIR PROBE: CPT | Performed by: NURSE PRACTITIONER

## 2023-04-21 PROCEDURE — 87510 GARDNER VAG DNA DIR PROBE: CPT | Performed by: NURSE PRACTITIONER

## 2023-04-21 PROCEDURE — 87591 N.GONORRHOEAE DNA AMP PROB: CPT | Performed by: NURSE PRACTITIONER

## 2023-04-21 PROCEDURE — 87660 TRICHOMONAS VAGIN DIR PROBE: CPT | Performed by: NURSE PRACTITIONER

## 2023-05-20 PROCEDURE — 82948 REAGENT STRIP/BLOOD GLUCOSE: CPT

## 2023-06-13 PROCEDURE — 87660 TRICHOMONAS VAGIN DIR PROBE: CPT

## 2023-06-13 PROCEDURE — 87086 URINE CULTURE/COLONY COUNT: CPT

## 2023-06-13 PROCEDURE — 87510 GARDNER VAG DNA DIR PROBE: CPT

## 2023-06-13 PROCEDURE — 87480 CANDIDA DNA DIR PROBE: CPT

## 2023-07-14 ENCOUNTER — APPOINTMENT (OUTPATIENT)
Dept: GENERAL RADIOLOGY | Facility: HOSPITAL | Age: 23
End: 2023-07-14
Payer: COMMERCIAL

## 2023-07-14 PROCEDURE — 74019 RADEX ABDOMEN 2 VIEWS: CPT

## 2023-07-14 PROCEDURE — 99283 EMERGENCY DEPT VISIT LOW MDM: CPT

## 2023-07-15 ENCOUNTER — HOSPITAL ENCOUNTER (EMERGENCY)
Facility: HOSPITAL | Age: 23
Discharge: HOME | End: 2023-07-15
Attending: EMERGENCY MEDICINE | Admitting: EMERGENCY MEDICINE
Payer: COMMERCIAL

## 2023-07-15 VITALS
OXYGEN SATURATION: 99 % | HEIGHT: 60 IN | DIASTOLIC BLOOD PRESSURE: 73 MMHG | TEMPERATURE: 98.6 F | HEART RATE: 101 BPM | WEIGHT: 197.09 LBS | SYSTOLIC BLOOD PRESSURE: 119 MMHG | BODY MASS INDEX: 38.69 KG/M2 | RESPIRATION RATE: 18 BRPM

## 2023-07-15 DIAGNOSIS — K59.03 DRUG-INDUCED CONSTIPATION: Primary | ICD-10-CM

## 2023-07-15 PROCEDURE — 63710000001 ONDANSETRON ODT 4 MG TABLET DISPERSIBLE: Performed by: NURSE PRACTITIONER

## 2023-07-15 RX ORDER — BUPRENORPHINE HYDROCHLORIDE AND NALOXONE HYDROCHLORIDE DIHYDRATE 8; 2 MG/1; MG/1
1 TABLET SUBLINGUAL DAILY
COMMUNITY
Start: 2023-07-10

## 2023-07-15 RX ORDER — DOCUSATE SODIUM 100 MG/1
100 CAPSULE, LIQUID FILLED ORAL 2 TIMES DAILY
Qty: 60 CAPSULE | Refills: 0 | Status: SHIPPED | OUTPATIENT
Start: 2023-07-15 | End: 2023-08-14

## 2023-07-15 RX ORDER — ONDANSETRON 4 MG/1
4 TABLET, ORALLY DISINTEGRATING ORAL ONCE
Status: COMPLETED | OUTPATIENT
Start: 2023-07-15 | End: 2023-07-15

## 2023-07-15 RX ORDER — DICYCLOMINE HCL 20 MG
20 TABLET ORAL EVERY 6 HOURS
Qty: 20 TABLET | Refills: 0 | Status: SHIPPED | OUTPATIENT
Start: 2023-07-15

## 2023-07-15 RX ORDER — MAGNESIUM CARB/ALUMINUM HYDROX 105-160MG
296 TABLET,CHEWABLE ORAL ONCE
Status: COMPLETED | OUTPATIENT
Start: 2023-07-15 | End: 2023-07-15

## 2023-07-15 RX ORDER — ONDANSETRON 4 MG/1
4 TABLET, ORALLY DISINTEGRATING ORAL 4 TIMES DAILY PRN
Qty: 15 TABLET | Refills: 0 | Status: SHIPPED | OUTPATIENT
Start: 2023-07-15

## 2023-07-15 RX ORDER — DICYCLOMINE HYDROCHLORIDE 10 MG/1
40 CAPSULE ORAL ONCE
Status: COMPLETED | OUTPATIENT
Start: 2023-07-15 | End: 2023-07-15

## 2023-07-15 RX ADMIN — MAGNESIUM CITRATE 296 ML: 1.75 LIQUID ORAL at 03:32

## 2023-07-15 RX ADMIN — ONDANSETRON 4 MG: 4 TABLET, ORALLY DISINTEGRATING ORAL at 03:13

## 2023-07-15 RX ADMIN — DICYCLOMINE HYDROCHLORIDE 40 MG: 10 CAPSULE ORAL at 03:11

## 2023-07-15 NOTE — DISCHARGE INSTRUCTIONS
Rest, drink plenty of fluids.  Make sure you drink two 8 ounce glasses of water before going to bed.  Take your meds as prescribed.  When you get up today if you have not had good bowel movement results you may repeat the magnesium citrate and two 8 ounce glasses of water for better results.  You may take over-the-counter acetaminophen as needed for aches pains and fever.  Use the stool softeners daily and increase your daily dietary fiber to help counteract the Suboxone that is probably constipating you.  Follow-up with your family doctor next week for reevaluation and further treatment as necessary.  Return to the emergency department for any acutely developing abdominal pain, any persistent vomiting, or any new or worse concerns.

## 2023-07-15 NOTE — Clinical Note
[facility] EMERGENCY ROOM  [address]  [place] [address]  Phone: [phone]    Bekah Rubin was seen and treated in our emergency department on 7/14/2023.  She may return to work on 07/16/2023.         Thank you for choosing [facility].    Anamaria Trevino APRN

## 2023-07-15 NOTE — ED PROVIDER NOTES
Time: 11:40 PM EDT  Date of encounter:  7/14/2023  Independent Historian/Clinical History and Information was obtained by:   Patient    History is limited by: N/A    Chief Complaint: Constipation      History of Present Illness:  Patient is a 23 y.o. year old female who presents to the emergency department for evaluation of constipation for the past couple days.  Just recently started Suboxone.  Pain at rectum.    The patient presents to the emergency department and states that she has had a bowel movement for the last couple days.  She states that she recently started Suboxone and this is caused her to have increased constipation.  She denies any recent fevers.  She states she is had no nausea or vomiting.  She states she is having rectal pain and feels that there is a large stool burden right there states that it feels too large to go.  Tender and painful but she has had no bleeding.  She denies any diarrhea.  She denies any abdominal pain or tenderness.  She reports no back pain or urinary symptoms.  She states she had no recent fevers.      History provided by:  Patient   used: No      Patient Care Team  Primary Care Provider: Chavez Pearson MD    Past Medical History:     No Known Allergies  Past Medical History:   Diagnosis Date    Herpes     Herpes     Hyperlipidemia      History reviewed. No pertinent surgical history.  History reviewed. No pertinent family history.    Home Medications:  Prior to Admission medications    Medication Sig Start Date End Date Taking? Authorizing Provider   Cholecalciferol (Vitamin D3) 50 MCG (2000 UT) capsule Take 1 capsule by mouth Daily. 5/24/23   Manjit Anders MD   valACYclovir (VALTREX) 500 MG tablet Take 1 tablet by mouth Daily. 3/24/23   Manjit Anders MD        Social History:   Social History     Tobacco Use    Smoking status: Former     Packs/day: 0.50     Types: Cigarettes     Passive exposure: Never    Smokeless tobacco: Never   Vaping  "Use    Vaping Use: Every day    Substances: Nicotine, Flavoring   Substance Use Topics    Alcohol use: Never    Drug use: Not Currently     Types: Methamphetamines, IV     Comment: former user last use 5/3/22         Review of Systems:  Review of Systems   Constitutional:  Negative for chills and fever.   HENT:  Negative for congestion, ear pain and sore throat.    Eyes:  Negative for pain.   Respiratory:  Negative for cough, chest tightness and shortness of breath.    Cardiovascular:  Negative for chest pain.   Gastrointestinal:  Positive for abdominal pain (BLOATED) and constipation. Negative for diarrhea, nausea and vomiting.   Genitourinary:  Negative for dysuria, flank pain, frequency, hematuria and urgency.   Musculoskeletal:  Negative for back pain, joint swelling, neck pain and neck stiffness.   Skin:  Negative for pallor.   Neurological:  Negative for seizures and headaches.   All other systems reviewed and are negative.     Physical Exam:  /73   Pulse 101   Temp 98.6 °F (37 °C)   Resp 18   Ht 152.4 cm (60\")   Wt 89.4 kg (197 lb 1.5 oz)   LMP 06/26/2023   SpO2 99%   BMI 38.49 kg/m²     Physical Exam  HENT:      Head: Normocephalic.      Mouth/Throat:      Mouth: Mucous membranes are moist.   Eyes:      Pupils: Pupils are equal, round, and reactive to light.   Pulmonary:      Effort: Pulmonary effort is normal.   Abdominal:      General: There is no distension.      Tenderness: There is no abdominal tenderness. There is no guarding or rebound.   Musculoskeletal:         General: Normal range of motion.      Cervical back: Neck supple.   Skin:     General: Skin is warm and dry.      Capillary Refill: Capillary refill takes less than 2 seconds.      Findings: No rash.   Neurological:      General: No focal deficit present.      Mental Status: She is alert and oriented to person, place, and time. Mental status is at baseline.   Psychiatric:         Mood and Affect: Mood normal.         Behavior: " Behavior normal.              Procedures:  Procedures      Medical Decision Making:      Comorbidities that affect care:  Herpes, hyperlipidemia, substance abuse      External Notes reviewed:    None      The following orders were placed and all results were independently analyzed by me:  Orders Placed This Encounter   Procedures    XR Abdomen Flat & Upright       Medications Given in the Emergency Department:  Medications   dicyclomine (BENTYL) capsule 40 mg (40 mg Oral Given 7/15/23 0311)   ondansetron ODT (ZOFRAN-ODT) disintegrating tablet 4 mg (4 mg Oral Given 7/15/23 0313)   magnesium citrate solution 296 mL (296 mL Oral Given 7/15/23 0332)        ED Course:         Labs:    Lab Results (last 24 hours)       ** No results found for the last 24 hours. **             Imaging:    No Radiology Exams Resulted Within Past 24 Hours      Differential Diagnosis and Discussion:    Abdominal Pain: Based on the patient's signs and symptoms, I considered abdominal aortic aneurysm, small bowel obstruction, pancreatitis, acute cholecystitis, acute appendecitis, peptic ulcer disease, gastritis, colitis, endocrine disorders, irritable bowel syndrome and other differential diagnosis an etiology of the patient's abdominal pain.    All labs were reviewed and interpreted by me.  All X-rays impressions were independently interpreted by me.    MDM  Number of Diagnoses or Management Options  Drug-induced constipation: new and requires workup     Amount and/or Complexity of Data Reviewed  Tests in the radiology section of CPT®: reviewed    Risk of Complications, Morbidity, and/or Mortality  Presenting problems: low  Diagnostic procedures: low  Management options: low    Patient Progress  Patient progress: stable         Patient Care Considerations:    I considered an enema but the patient had a large bowel movement that was not necessary in the emergency department.      Consultants/Shared Management Plan:    None    Social  Determinants of Health:    Patient is independent, reliable, and has access to care.       Disposition and Care Coordination:    Discharged: The patient is suitable and stable for discharge with no need for consideration of observation or admission.    I have explained the patient´s condition, diagnoses and treatment plan based on the information available to me at this time. I have answered questions and addressed any concerns. The patient has a good  understanding of the patient´s diagnosis, condition, and treatment plan as can be expected at this point. The vital signs have been stable. The patient´s condition is stable and appropriate for discharge from the emergency department.      The patient will pursue further outpatient evaluation with the primary care physician or other designated or consulting physician as outlined in the discharge instructions. They are agreeable to this plan of care and follow-up instructions have been explained in detail. The patient has received these instructions in written format and have expressed an understanding of the discharge instructions. The patient is aware that any significant change in condition or worsening of symptoms should prompt an immediate return to this or the closest emergency department or call to 911.  I have explained discharge medications and the need for follow up with the patient/caretakers. This was also printed in the discharge instructions. Patient was discharged with the following medications and follow up:      Medication List        New Prescriptions      dicyclomine 20 MG tablet  Commonly known as: BENTYL  Take 1 tablet by mouth Every 6 (Six) Hours.     docusate sodium 100 MG capsule  Commonly known as: COLACE  Take 1 capsule by mouth 2 (Two) Times a Day for 30 days.     ondansetron ODT 4 MG disintegrating tablet  Commonly known as: ZOFRAN-ODT  Place 1 tablet on the tongue 4 (Four) Times a Day As Needed for Vomiting or Nausea.            ASK your  doctor about these medications      magnesium citrate solution  Take 296 mL by mouth 1 (One) Time for 1 dose.  Ask about: Should I take this medication?               Where to Get Your Medications        These medications were sent to [facility]/pharmacy #[id number] - [place], [place] - [address] - [phone]  - [phone] FX  [address] [place] [address]      Hours: 24-hours Phone: [phone]   dicyclomine 20 MG tablet  docusate sodium 100 MG capsule  magnesium citrate solution  ondansetron ODT 4 MG disintegrating tablet      Chavez Pearson MD  [address]  [address]  [facility] [address] [phone]    Call   FOR FOLLOW UP       Final diagnoses:   Drug-induced constipation        ED Disposition       ED Disposition   Discharge    Condition   Stable    Comment   --               This medical record created using voice recognition software.             Anamaria Trevino, TRU  07/21/23 [id number]

## 2023-07-15 NOTE — ED TRIAGE NOTES
"Pt c/o constipation. Pt states that \"I tried to have a BM and it was so big I could not pass it\" pt recently started in suboxin   "

## 2023-09-07 PROCEDURE — 87660 TRICHOMONAS VAGIN DIR PROBE: CPT | Performed by: NURSE PRACTITIONER

## 2023-09-07 PROCEDURE — 87086 URINE CULTURE/COLONY COUNT: CPT | Performed by: NURSE PRACTITIONER

## 2023-09-07 PROCEDURE — 87510 GARDNER VAG DNA DIR PROBE: CPT | Performed by: NURSE PRACTITIONER

## 2023-09-07 PROCEDURE — 87186 SC STD MICRODIL/AGAR DIL: CPT | Performed by: NURSE PRACTITIONER

## 2023-09-07 PROCEDURE — 87077 CULTURE AEROBIC IDENTIFY: CPT | Performed by: NURSE PRACTITIONER

## 2023-09-07 PROCEDURE — 87480 CANDIDA DNA DIR PROBE: CPT | Performed by: NURSE PRACTITIONER

## 2023-11-02 PROCEDURE — 87077 CULTURE AEROBIC IDENTIFY: CPT | Performed by: PHYSICIAN ASSISTANT

## 2023-11-02 PROCEDURE — 87186 SC STD MICRODIL/AGAR DIL: CPT | Performed by: PHYSICIAN ASSISTANT

## 2023-11-02 PROCEDURE — 87086 URINE CULTURE/COLONY COUNT: CPT | Performed by: PHYSICIAN ASSISTANT

## 2024-01-22 PROCEDURE — 87086 URINE CULTURE/COLONY COUNT: CPT | Performed by: NURSE PRACTITIONER

## 2024-02-17 ENCOUNTER — HOSPITAL ENCOUNTER (EMERGENCY)
Facility: HOSPITAL | Age: 24
Discharge: HOME | End: 2024-02-18
Attending: EMERGENCY MEDICINE
Payer: COMMERCIAL

## 2024-02-17 VITALS
OXYGEN SATURATION: 100 % | SYSTOLIC BLOOD PRESSURE: 126 MMHG | TEMPERATURE: 98.1 F | RESPIRATION RATE: 18 BRPM | DIASTOLIC BLOOD PRESSURE: 93 MMHG | HEART RATE: 108 BPM

## 2024-02-17 DIAGNOSIS — K52.9 GASTROENTERITIS: Primary | ICD-10-CM

## 2024-02-17 DIAGNOSIS — B34.9 NONSPECIFIC SYNDROME SUGGESTIVE OF VIRAL ILLNESS: ICD-10-CM

## 2024-02-17 DIAGNOSIS — Z3A.01 LESS THAN 8 WEEKS GESTATION OF PREGNANCY: ICD-10-CM

## 2024-02-17 LAB
ALBUMIN SERPL-MCNC: 4.7 G/DL (ref 3.5–5.2)
ALBUMIN/GLOB SERPL: 1.3 G/DL
ALP SERPL-CCNC: 72 U/L (ref 39–117)
ALT SERPL W P-5'-P-CCNC: 55 U/L (ref 1–33)
ANION GAP SERPL CALCULATED.3IONS-SCNC: 15.9 MMOL/L (ref 5–15)
AST SERPL-CCNC: 30 U/L (ref 1–32)
BASOPHILS # BLD AUTO: 0.03 10*3/MM3 (ref 0–0.2)
BASOPHILS NFR BLD AUTO: 0.1 % (ref 0–1.5)
BILIRUB SERPL-MCNC: 0.4 MG/DL (ref 0–1.2)
BILIRUB UR QL STRIP: NEGATIVE
BUN SERPL-MCNC: 8 MG/DL (ref 6–20)
BUN/CREAT SERPL: 12.1 (ref 7–25)
CALCIUM SPEC-SCNC: 10.1 MG/DL (ref 8.6–10.5)
CHLORIDE SERPL-SCNC: 102 MMOL/L (ref 98–107)
CLARITY UR: ABNORMAL
CO2 SERPL-SCNC: 20.1 MMOL/L (ref 22–29)
COLOR UR: YELLOW
CREAT SERPL-MCNC: 0.66 MG/DL (ref 0.57–1)
DEPRECATED RDW RBC AUTO: 39 FL (ref 37–54)
EGFRCR SERPLBLD CKD-EPI 2021: 126.6 ML/MIN/1.73
EOSINOPHIL # BLD AUTO: 0.07 10*3/MM3 (ref 0–0.4)
EOSINOPHIL NFR BLD AUTO: 0.3 % (ref 0.3–6.2)
ERYTHROCYTE [DISTWIDTH] IN BLOOD BY AUTOMATED COUNT: 12.9 % (ref 12.3–15.4)
GLOBULIN UR ELPH-MCNC: 3.6 GM/DL
GLUCOSE SERPL-MCNC: 102 MG/DL (ref 65–99)
GLUCOSE UR STRIP-MCNC: NEGATIVE MG/DL
HCG INTACT+B SERPL-ACNC: NORMAL MIU/ML
HCT VFR BLD AUTO: 45.2 % (ref 34–46.6)
HGB BLD-MCNC: 15.2 G/DL (ref 12–15.9)
HGB UR QL STRIP.AUTO: NEGATIVE
HOLD SPECIMEN: NORMAL
HOLD SPECIMEN: NORMAL
IMM GRANULOCYTES # BLD AUTO: 0.06 10*3/MM3 (ref 0–0.05)
IMM GRANULOCYTES NFR BLD AUTO: 0.3 % (ref 0–0.5)
KETONES UR QL STRIP: NEGATIVE
LEUKOCYTE ESTERASE UR QL STRIP.AUTO: NEGATIVE
LIPASE SERPL-CCNC: 24 U/L (ref 13–60)
LYMPHOCYTES # BLD AUTO: 1.45 10*3/MM3 (ref 0.7–3.1)
LYMPHOCYTES NFR BLD AUTO: 7 % (ref 19.6–45.3)
MCH RBC QN AUTO: 28.3 PG (ref 26.6–33)
MCHC RBC AUTO-ENTMCNC: 33.6 G/DL (ref 31.5–35.7)
MCV RBC AUTO: 84 FL (ref 79–97)
MONOCYTES # BLD AUTO: 1.01 10*3/MM3 (ref 0.1–0.9)
MONOCYTES NFR BLD AUTO: 4.9 % (ref 5–12)
NEUTROPHILS NFR BLD AUTO: 18.12 10*3/MM3 (ref 1.7–7)
NEUTROPHILS NFR BLD AUTO: 87.4 % (ref 42.7–76)
NITRITE UR QL STRIP: NEGATIVE
NRBC BLD AUTO-RTO: 0 /100 WBC (ref 0–0.2)
PH UR STRIP.AUTO: 5.5 [PH] (ref 5–8)
PLATELET # BLD AUTO: 276 10*3/MM3 (ref 140–450)
PMV BLD AUTO: 10.4 FL (ref 6–12)
POTASSIUM SERPL-SCNC: 4 MMOL/L (ref 3.5–5.2)
PROT SERPL-MCNC: 8.3 G/DL (ref 6–8.5)
PROT UR QL STRIP: ABNORMAL
RBC # BLD AUTO: 5.38 10*6/MM3 (ref 3.77–5.28)
SODIUM SERPL-SCNC: 138 MMOL/L (ref 136–145)
SP GR UR STRIP: 1.02 (ref 1–1.03)
UROBILINOGEN UR QL STRIP: ABNORMAL
WBC NRBC COR # BLD AUTO: 20.74 10*3/MM3 (ref 3.4–10.8)
WHOLE BLOOD HOLD COAG: NORMAL
WHOLE BLOOD HOLD SPECIMEN: NORMAL

## 2024-02-17 PROCEDURE — 36415 COLL VENOUS BLD VENIPUNCTURE: CPT | Performed by: EMERGENCY MEDICINE

## 2024-02-17 PROCEDURE — 96374 THER/PROPH/DIAG INJ IV PUSH: CPT

## 2024-02-17 PROCEDURE — 99284 EMERGENCY DEPT VISIT MOD MDM: CPT

## 2024-02-17 PROCEDURE — 81003 URINALYSIS AUTO W/O SCOPE: CPT | Performed by: EMERGENCY MEDICINE

## 2024-02-17 PROCEDURE — 25010000002 ONDANSETRON PER 1 MG: Performed by: NURSE PRACTITIONER

## 2024-02-17 PROCEDURE — 84702 CHORIONIC GONADOTROPIN TEST: CPT | Performed by: EMERGENCY MEDICINE

## 2024-02-17 PROCEDURE — 83690 ASSAY OF LIPASE: CPT | Performed by: EMERGENCY MEDICINE

## 2024-02-17 PROCEDURE — 80053 COMPREHEN METABOLIC PANEL: CPT | Performed by: EMERGENCY MEDICINE

## 2024-02-17 PROCEDURE — 25810000003 SODIUM CHLORIDE 0.9 % SOLUTION: Performed by: NURSE PRACTITIONER

## 2024-02-17 PROCEDURE — 96361 HYDRATE IV INFUSION ADD-ON: CPT

## 2024-02-17 PROCEDURE — 85025 COMPLETE CBC W/AUTO DIFF WBC: CPT | Performed by: EMERGENCY MEDICINE

## 2024-02-17 RX ORDER — ONDANSETRON 2 MG/ML
4 INJECTION INTRAMUSCULAR; INTRAVENOUS ONCE
Status: COMPLETED | OUTPATIENT
Start: 2024-02-17 | End: 2024-02-17

## 2024-02-17 RX ORDER — SODIUM CHLORIDE 0.9 % (FLUSH) 0.9 %
10 SYRINGE (ML) INJECTION AS NEEDED
Status: DISCONTINUED | OUTPATIENT
Start: 2024-02-17 | End: 2024-02-18 | Stop reason: HOSPADM

## 2024-02-17 RX ADMIN — ONDANSETRON 4 MG: 2 INJECTION INTRAMUSCULAR; INTRAVENOUS at 23:27

## 2024-02-17 RX ADMIN — SODIUM CHLORIDE 1000 ML: 9 INJECTION, SOLUTION INTRAVENOUS at 23:27

## 2024-02-17 NOTE — Clinical Note
[facility] EMERGENCY ROOM  [address]  [place] [address]  Phone: [phone]    Bekah Rubin was seen and treated in our emergency department on 2/17/2024.  She may return to work on 02/19/2024.         Thank you for choosing [facility].    Sergio Campos DO

## 2024-02-18 ENCOUNTER — APPOINTMENT (OUTPATIENT)
Dept: ULTRASOUND IMAGING | Facility: HOSPITAL | Age: 24
End: 2024-02-18
Payer: COMMERCIAL

## 2024-02-18 PROCEDURE — 76817 TRANSVAGINAL US OBSTETRIC: CPT

## 2024-02-18 RX ORDER — DICYCLOMINE HCL 20 MG
20 TABLET ORAL EVERY 6 HOURS PRN
Qty: 30 TABLET | Refills: 0 | Status: SHIPPED | OUTPATIENT
Start: 2024-02-18

## 2024-02-18 RX ORDER — ONDANSETRON 4 MG/1
4 TABLET, ORALLY DISINTEGRATING ORAL 4 TIMES DAILY PRN
Qty: 6 TABLET | Refills: 0 | Status: SHIPPED | OUTPATIENT
Start: 2024-02-18

## 2024-02-18 NOTE — ED PROVIDER NOTES
Time: 11:13 PM EST  Date of encounter:  2/17/2024  Independent Historian/Clinical History and Information was obtained by:   Patient    History is limited by: N/A    Chief Complaint: Vomiting and diarrhea      History of Present Illness:  Patient is a 24 y.o. year old female who presents to the emergency department for evaluation of vomiting and diarrhea.  Patient states today at 5 PM she ate [employer] and within an hour was having vomiting and watery diarrhea.  Patient complains of abdominal cramping that hits her and she immediately has to go have a bowel movement.  Pain goes away after that.  Otherwise patient had been completely fine all day with no issues.  Patient has no fever.  No dysuria.  No flank pain.  No URI symptoms.  Patient states she is pregnant and has had a positive home test.  Has her first appointment at the end of the month.  Patient denies any vaginal bleeding    HPI    Patient Care Team  Primary Care Provider: Yahaira Lauren APRN    Past Medical History:     No Known Allergies  Past Medical History:   Diagnosis Date    Herpes     Herpes     Hyperlipidemia      History reviewed. No pertinent surgical history.  History reviewed. No pertinent family history.    Home Medications:  Prior to Admission medications    Medication Sig Start Date End Date Taking? Authorizing Provider   ondansetron ODT (ZOFRAN-ODT) 4 MG disintegrating tablet Place 1 tablet on the tongue 4 (Four) Times a Day As Needed for Nausea or Vomiting for up to 6 doses. 12/7/23   Lobo Escalante PA-C   valACYclovir (VALTREX) 500 MG tablet Take 1 tablet by mouth As Needed. 3/24/23   Provider, MD Manjit        Social History:   Social History     Tobacco Use    Smoking status: Former     Packs/day: .5     Types: Cigarettes     Passive exposure: Past    Smokeless tobacco: Never   Vaping Use    Vaping Use: Every day    Substances: Nicotine, Flavoring   Substance Use Topics    Alcohol use: Never    Drug use: Not Currently      Types: Methamphetamines, IV     Comment: former user last use 5/3/22         Review of Systems:  Review of Systems   Constitutional:  Negative for chills and fever.   HENT: Negative.  Negative for congestion, ear pain and sore throat.    Eyes:  Negative for pain.   Respiratory:  Negative for cough, chest tightness and shortness of breath.    Cardiovascular:  Negative for chest pain.   Gastrointestinal:  Positive for abdominal pain, diarrhea, nausea and vomiting.   Genitourinary:  Negative for flank pain, hematuria, pelvic pain, vaginal bleeding and vaginal discharge.   Musculoskeletal:  Negative for joint swelling.   Skin: Negative.  Negative for pallor.   Neurological:  Negative for seizures and headaches.   Hematological: Negative.    Psychiatric/Behavioral: Negative.     All other systems reviewed and are negative.       Physical Exam:  /93 (BP Location: Right arm, Patient Position: Sitting)   Pulse 108   Temp 98.1 °F (36.7 °C) (Oral)   Resp 18   LMP 01/10/2024 (Approximate) Comment: 7 weeks  SpO2 100%     Physical Exam  Vitals and nursing note reviewed.   Constitutional:       General: She is not in acute distress.     Appearance: Normal appearance. She is not toxic-appearing.   HENT:      Head: Normocephalic and atraumatic.      Right Ear: Tympanic membrane, ear canal and external ear normal.      Left Ear: Tympanic membrane, ear canal and external ear normal.      Nose: Nose normal.      Mouth/Throat:      Mouth: Mucous membranes are moist.      Pharynx: No posterior oropharyngeal erythema.   Eyes:      General: No scleral icterus.     Conjunctiva/sclera: Conjunctivae normal.   Cardiovascular:      Rate and Rhythm: Regular rhythm. Tachycardia present.      Pulses: Normal pulses.      Heart sounds: Normal heart sounds.   Pulmonary:      Effort: Pulmonary effort is normal. No respiratory distress.      Breath sounds: Normal breath sounds.   Abdominal:      General: Bowel sounds are normal.       Palpations: Abdomen is soft.      Tenderness: There is no abdominal tenderness. There is no right CVA tenderness or left CVA tenderness.   Musculoskeletal:         General: Normal range of motion.      Cervical back: Normal range of motion and neck supple.   Skin:     General: Skin is warm and dry.   Neurological:      Mental Status: She is alert and oriented to person, place, and time.   Psychiatric:         Mood and Affect: Mood normal.         Behavior: Behavior normal.                  Medical Decision Making:      Comorbidities that affect care:    Pregnancy    External Notes reviewed:    Previous Clinic Note: Outpatient PCP visit on February 7 for quant check showed a hCG of 2695.      The following orders were placed and all results were independently analyzed by me:  Orders Placed This Encounter   Procedures    US Ob Transvaginal    Wells Draw    Comprehensive Metabolic Panel    Lipase    Urinalysis With Microscopic If Indicated (No Culture) - Urine, Clean Catch    hCG, Quantitative, Pregnancy    CBC Auto Differential    NPO Diet NPO Type: Strict NPO    Undress & Gown    Insert Peripheral IV    CBC & Differential    Green Top (Gel)    Lavender Top    Gold Top - SST    Light Blue Top       Medications Given in the Emergency Department:  Medications   sodium chloride 0.9 % flush 10 mL (has no administration in time range)   ondansetron (ZOFRAN) injection 4 mg (4 mg Intravenous Given 2/17/24 2327)   sodium chloride 0.9 % bolus 1,000 mL (1,000 mL Intravenous New Bag 2/17/24 2327)        ED Course:    ED Course as of 02/18/24 0103   Sun Feb 18, 2024   0055  Ob Transvaginal  Viable intrauterine gestation measuring 6 weeks 2 days [DS]      ED Course User Index  [DS] Pamela Urbano APRN       Labs:    Lab Results (last 24 hours)       Procedure Component Value Units Date/Time    CBC & Differential [[id number]]  (Abnormal) Collected: 02/17/24 2306    Specimen: Blood from Arm, Left Updated: 02/17/24 2313     Narrative:      The following orders were created for panel order CBC & Differential.  Procedure                               Abnormality         Status                     ---------                               -----------         ------                     CBC Auto Differential[[id number]]        Abnormal            Final result                 Please view results for these tests on the individual orders.    Comprehensive Metabolic Panel [[id number]]  (Abnormal) Collected: 02/17/24 2306    Specimen: Blood from Arm, Left Updated: 02/17/24 2339     Glucose 102 mg/dL      BUN 8 mg/dL      Creatinine 0.66 mg/dL      Sodium 138 mmol/L      Potassium 4.0 mmol/L      Chloride 102 mmol/L      CO2 20.1 mmol/L      Calcium 10.1 mg/dL      Total Protein 8.3 g/dL      Albumin 4.7 g/dL      ALT (SGPT) 55 U/L      AST (SGOT) 30 U/L      Alkaline Phosphatase 72 U/L      Total Bilirubin 0.4 mg/dL      Globulin 3.6 gm/dL      A/G Ratio 1.3 g/dL      BUN/Creatinine Ratio 12.1     Anion Gap 15.9 mmol/L      eGFR 126.6 mL/min/1.73     Narrative:      GFR Normal >60  Chronic Kidney Disease <60  Kidney Failure <15      Lipase [[id number]]  (Normal) Collected: 02/17/24 2306    Specimen: Blood from Arm, Left Updated: 02/17/24 2339     Lipase 24 U/L     hCG, Quantitative, Pregnancy [[id number]] Collected: 02/17/24 2306    Specimen: Blood from Arm, Left Updated: 02/17/24 2349     HCG Quantitative 40,306.00 mIU/mL     Narrative:      HCG Ranges by Gestational Age    Females - non-pregnant premenopausal   </= 1mIU/mL HCG  Females - postmenopausal               </= 7mIU/mL HCG    3 Weeks       5.4   -      72 mIU/mL  4 Weeks      10.2   -     708 mIU/mL  5 Weeks       217   -   8,245 mIU/mL  6 Weeks       152   -  32,177 mIU/mL  7 Weeks     4,059   - 153,767 mIU/mL  8 Weeks    31,366   - 149,094 mIU/mL  9 Weeks    59,109   - 135,901 mIU/mL  10 Weeks   44,186   - 170,409 mIU/mL  12 Weeks   27,107   - 201,615 mIU/mL  14 Weeks   24,302   -  93,646  mIU/mL  15 Weeks   12,540   -  69,747 mIU/mL  16 Weeks    8,904   -  55,332 mIU/mL  17 Weeks    8,240   -  51,793 mIU/mL  18 Weeks    9,649   -  55,271 mIU/mL      CBC Auto Differential [[id number]]  (Abnormal) Collected: 02/17/24 2306    Specimen: Blood from Arm, Left Updated: 02/17/24 2313     WBC 20.74 10*3/mm3      RBC 5.38 10*6/mm3      Hemoglobin 15.2 g/dL      Hematocrit 45.2 %      MCV 84.0 fL      MCH 28.3 pg      MCHC 33.6 g/dL      RDW 12.9 %      RDW-SD 39.0 fl      MPV 10.4 fL      Platelets 276 10*3/mm3      Neutrophil % 87.4 %      Lymphocyte % 7.0 %      Monocyte % 4.9 %      Eosinophil % 0.3 %      Basophil % 0.1 %      Immature Grans % 0.3 %      Neutrophils, Absolute 18.12 10*3/mm3      Lymphocytes, Absolute 1.45 10*3/mm3      Monocytes, Absolute 1.01 10*3/mm3      Eosinophils, Absolute 0.07 10*3/mm3      Basophils, Absolute 0.03 10*3/mm3      Immature Grans, Absolute 0.06 10*3/mm3      nRBC 0.0 /100 WBC     Urinalysis With Microscopic If Indicated (No Culture) - Urine, Clean Catch [[id number]]  (Abnormal) Collected: 02/17/24 2327    Specimen: Urine, Clean Catch Updated: 02/17/24 2343     Color, UA Yellow     Appearance, UA Cloudy     pH, UA 5.5     Specific Gravity, UA 1.021     Glucose, UA Negative     Ketones, UA Negative     Bilirubin, UA Negative     Blood, UA Negative     Protein, UA Trace     Leuk Esterase, UA Negative     Nitrite, UA Negative     Urobilinogen, UA 0.2 E.U./dL    Narrative:      Urine microscopic not indicated.             Imaging:    US Ob Transvaginal    Result Date: 2/18/2024  PROCEDURE: US OB TRANSVAGINAL  COMPARISON: [facility], US, US OB TRANSVAGINAL, 3/28/2023, 22:49.  INDICATIONS: Positive pregnancy test with beta HCG value of 40,306. Cramping.  TECHNIQUE: Ultrasound examination of the pelvis was performed, using endovaginal technique.   FINDINGS:  Uterus measures 8.2 x 5.0 x 5.7 cm.  It contains an intrauterine pregnancy with a fetal pole and a small  yolk sac.  Heart rate is measured at 116 beats per minute by M-mode Doppler.  There is a small adjacent subchorionic hemorrhage measuring 0.9 x 0.3 x 1.0 cm.  Estimated age by ultrasound measurement is 6 weeks 2 days for an estimated delivery date of 10/11/2024.  Right ovary measures 3.0 x 1.4 x 1.7 cm.  Left ovary measures 3.4 x 2.2 x 2.7 cm.  Both ovaries show perfusion by Doppler.  Corpus luteum is likely present on the left measuring up to about 2.6 cm.        1. The ovaries show perfusion by Doppler.  Corpus luteum suspected on the left ovary. 2. Living intrauterine pregnancy at approximately 6 weeks 2 days gestational age.  Fetal heart rate measured at 116 beats per minute.  Obstetrical follow-up recommended. 3. Small adjacent subchorionic hemorrhage.      AJAY BROWN MD       Electronically Signed and Approved By: AJAY BROWN MD on 2/18/2024 at 0:49                Differential Diagnosis and Discussion:    Diarrhea: Differential diagnosis includes but is not limited to malabsorption syndrome, bacterial infection, carcinoid syndrome, pancreatic hypersecretion, viral infection, celiac sprue, Crohn's disease, ulcerative colitis, ischemic colitis, colitis, hypermotility, and irritable bowel syndrome.  Vomiting: Differential diagnosis includes but is not limited to migraine, labyrinthine disorders, psychogenic, metabolic and endocrine causes, peptic ulcer, gastric outlet obstruction, gastritis, gastroenteritis, appendicitis, intestinal obstruction, paralytic ileus, food poisoning, cholecystitis, acute hepatitis, acute pancreatitis, acute febrile illness, and myocardial infarction.    All labs were reviewed and interpreted by me.    MDM  Number of Diagnoses or Management Options  Gastroenteritis  Less than 8 weeks gestation of pregnancy  Diagnosis management comments: The patient presents with vomiting and diarrhea consistent with gastroenteritis. The patient has a soft and benign abdominal exam. The patient  is now resting comfortably and feels better, is alert, and is in no distress. The patient is able to tolerate po intake in the ED. The patient´s labs were reviewed and hydration status was assessed. The patient has no signs of acute renal failure, sepsis, or dehydration that warrants admission to the hospital. The vital signs have been stable. The patient's condition is stable and appropriate for discharge. The patient will pursue further outpatient evaluation with the primary care physician or designated physician. The patient and/or caregivers have expressed a clear and thorough understanding and agreed to follow-up as instructed.       Amount and/or Complexity of Data Reviewed  Clinical lab tests: reviewed and ordered  Tests in the radiology section of CPT®: reviewed and ordered  Tests in the medicine section of CPT®: ordered and reviewed  Decide to obtain previous medical records or to obtain history from someone other than the patient: yes    Risk of Complications, Morbidity, and/or Mortality  Presenting problems: moderate  Diagnostic procedures: moderate  Management options: low    Patient Progress  Patient progress: stable         Patient Care Considerations:    CT ABDOMEN AND PELVIS: I considered ordering a CT scan of the abdomen and pelvis however patient is 6 weeks pregnant and has no reproducible abdominal pain.  ANTIBIOTICS: I considered prescribing antibiotics as an outpatient however no bacterial focus of infection was found.      Consultants/Shared Management Plan:    None    Social Determinants of Health:    Patient has presented with family members who are responsible, reliable and will ensure follow up care.      Disposition and Care Coordination:    Discharged: I considered escalation of care by admitting this patient to the hospital, however the patient has improved and is suitable and  stable for discharge.    I have explained the patient´s condition, diagnoses and treatment plan based on the  information available to me at this time. I have answered questions and addressed any concerns. The patient has a good  understanding of the patient´s diagnosis, condition, and treatment plan as can be expected at this point. The vital signs have been stable. The patient´s condition is stable and appropriate for discharge from the emergency department.      The patient will pursue further outpatient evaluation with the primary care physician or other designated or consulting physician as outlined in the discharge instructions. They are agreeable to this plan of care and follow-up instructions have been explained in detail. The patient has received these instructions in written format and has expressed an understanding of the discharge instructions. The patient is aware that any significant change in condition or worsening of symptoms should prompt an immediate return to this or the closest emergency department or call to 911.  I have explained discharge medications and the need for follow up with the patient/caretakers. This was also printed in the discharge instructions. Patient was discharged with the following medications and follow up:      Medication List        New Prescriptions      dicyclomine 20 MG tablet  Commonly known as: BENTYL  Take 1 tablet by mouth Every 6 (Six) Hours As Needed for Abdominal Cramping.               Where to Get Your Medications        These medications were sent to [facility] [id number] - [place], [place] - [address] AT [facility] [address] - [phone]  - [phone]   [address] [place] [address] [place] [address]      Phone: [phone]   dicyclomine 20 MG tablet  ondansetron ODT 4 MG disintegrating tablet      your ob[facility]    Call   As needed    Yahaira Lauren, APRN  [address]  [address]  [place] [address]  [phone]    Schedule an appointment as soon as possible for a visit          Final diagnoses:   Gastroenteritis   Less than 8 weeks gestation of  pregnancy        ED Disposition       ED Disposition   Discharge    Condition   Stable    Comment   --               This medical record created using voice recognition software.             Pamela Urbano, APRN  02/18/24 0103

## 2024-02-18 NOTE — DISCHARGE INSTRUCTIONS
Clear liquids. Advance diet as tolerated    Medications as prescribed for symptoms    US showed a viable intrauterine gestation measuring 6 weeks 2 days.  Follow-up with your OB/GYN for continued prenatal care

## 2024-02-28 ENCOUNTER — TRANSCRIBE ORDERS (OUTPATIENT)
Dept: ADMINISTRATIVE | Facility: HOSPITAL | Age: 24
End: 2024-02-28
Payer: COMMERCIAL

## 2024-02-28 DIAGNOSIS — O26.849 FETAL SIZE INCONSISTENT WITH DATES: Primary | ICD-10-CM

## 2024-03-01 LAB
EXTERNAL CHLAMYDIA SCREEN: NORMAL
EXTERNAL GONORRHEA SCREEN: NORMAL

## 2024-03-14 LAB
EXTERNAL HEPATITIS B SURFACE ANTIGEN: NEGATIVE
EXTERNAL RUBELLA QUALITATIVE: NORMAL
HIV1 P24 AG SERPL QL IA: NORMAL

## 2024-03-21 ENCOUNTER — HOSPITAL ENCOUNTER (OUTPATIENT)
Dept: ULTRASOUND IMAGING | Facility: HOSPITAL | Age: 24
Discharge: HOME | End: 2024-03-21
Admitting: OBSTETRICS & GYNECOLOGY
Payer: COMMERCIAL

## 2024-03-21 DIAGNOSIS — O26.849 FETAL SIZE INCONSISTENT WITH DATES: ICD-10-CM

## 2024-03-21 PROCEDURE — 76815 OB US LIMITED FETUS(S): CPT

## 2024-04-01 ENCOUNTER — HOSPITAL ENCOUNTER (EMERGENCY)
Facility: HOSPITAL | Age: 24
Discharge: HOME | End: 2024-04-01
Attending: EMERGENCY MEDICINE | Admitting: EMERGENCY MEDICINE
Payer: COMMERCIAL

## 2024-04-01 VITALS
HEIGHT: 61 IN | SYSTOLIC BLOOD PRESSURE: 119 MMHG | OXYGEN SATURATION: 99 % | DIASTOLIC BLOOD PRESSURE: 74 MMHG | WEIGHT: 183.42 LBS | HEART RATE: 105 BPM | TEMPERATURE: 98.9 F | BODY MASS INDEX: 34.63 KG/M2 | RESPIRATION RATE: 18 BRPM

## 2024-04-01 DIAGNOSIS — Z3A.13 13 WEEKS GESTATION OF PREGNANCY: ICD-10-CM

## 2024-04-01 DIAGNOSIS — R52 BODY ACHES: ICD-10-CM

## 2024-04-01 DIAGNOSIS — R11.2 NAUSEA AND VOMITING, UNSPECIFIED VOMITING TYPE: Primary | ICD-10-CM

## 2024-04-01 LAB
B-HCG UR QL: POSITIVE
BILIRUB UR QL STRIP: NEGATIVE
CLARITY UR: CLEAR
COLOR UR: YELLOW
FLUAV SUBTYP SPEC NAA+PROBE: NOT DETECTED
FLUBV RNA ISLT QL NAA+PROBE: NOT DETECTED
GLUCOSE UR STRIP-MCNC: NEGATIVE MG/DL
HGB UR QL STRIP.AUTO: NEGATIVE
KETONES UR QL STRIP: ABNORMAL
LEUKOCYTE ESTERASE UR QL STRIP.AUTO: NEGATIVE
NITRITE UR QL STRIP: NEGATIVE
PH UR STRIP.AUTO: 5.5 [PH] (ref 5–8)
PROT UR QL STRIP: NEGATIVE
RSV RNA NPH QL NAA+NON-PROBE: NOT DETECTED
SARS-COV-2 RNA RESP QL NAA+PROBE: NOT DETECTED
SP GR UR STRIP: 1.03 (ref 1–1.03)
UROBILINOGEN UR QL STRIP: ABNORMAL

## 2024-04-01 PROCEDURE — 99283 EMERGENCY DEPT VISIT LOW MDM: CPT

## 2024-04-01 PROCEDURE — 81003 URINALYSIS AUTO W/O SCOPE: CPT

## 2024-04-01 PROCEDURE — 81025 URINE PREGNANCY TEST: CPT

## 2024-04-01 PROCEDURE — 87637 SARSCOV2&INF A&B&RSV AMP PRB: CPT

## 2024-04-01 PROCEDURE — 63710000001 ONDANSETRON ODT 4 MG TABLET DISPERSIBLE

## 2024-04-01 RX ORDER — ASPIRIN 81 MG/1
81 TABLET, CHEWABLE ORAL DAILY
COMMUNITY
Start: 2024-03-28

## 2024-04-01 RX ORDER — PRENATAL VIT NO.126/IRON/FOLIC 28MG-0.8MG
TABLET ORAL DAILY
COMMUNITY

## 2024-04-01 RX ORDER — ONDANSETRON 4 MG/1
4 TABLET, ORALLY DISINTEGRATING ORAL ONCE
Status: COMPLETED | OUTPATIENT
Start: 2024-04-01 | End: 2024-04-01

## 2024-04-01 RX ADMIN — ONDANSETRON 4 MG: 4 TABLET, ORALLY DISINTEGRATING ORAL at 18:59

## 2024-04-01 NOTE — ED PROVIDER NOTES
Time: 6:51 PM EDT  Date of encounter:  2024  Independent Historian/Clinical History and Information was obtained by:   Patient    History is limited by: N/A    Chief Complaint   Patient presents with    Nausea     body    Generalized Body Aches         History of Present Illness:  Patient is a 24 y.o. year old female who presents to the emergency department for evaluation of body aches, nausea, vomiting.  The patient states that she is 13 weeks pregnant. She is . She states that she is okay with the nausea and vomiting due to her pregnancy but the body aches started last night. She has taken tylenol which has helped. She had 4-5 emesis episodes today.  She reports chills.  She denies any fever, SOA.  She denies any pelvic pain or vaginal bleed.  The patient looks well on exam.  Her mucous membranes are moist.  She denies any abdominal pain or tenderness with palpation.  She denies any urinary symptoms.  She reports that her next visit with Dr. Lund is .  Patient reports that she has a  Ab2    Patient Care Team  Primary Care Provider: Yahaira Lauren APRN    Past Medical History:     No Known Allergies  Past Medical History:   Diagnosis Date    Herpes     Herpes     Hyperlipidemia      History reviewed. No pertinent surgical history.  History reviewed. No pertinent family history.    Home Medications:  Prior to Admission medications    Medication Sig Start Date End Date Taking? Authorizing Provider   aspirin 81 MG chewable tablet Chew 1 tablet Daily. 3/28/24  Yes Manjit Anders MD   prenatal vitamin (prenatal, CLASSIC, vitamin) tablet Take  by mouth Daily.    Manjit Anders MD   valACYclovir (VALTREX) 500 MG tablet Take 1 tablet by mouth As Needed. 3/24/23   Manjit Anders MD   dicyclomine (BENTYL) 20 MG tablet Take 1 tablet by mouth Every 6 (Six) Hours As Needed for Abdominal Cramping. 24  Pamela Urbano APRN   ondansetron ODT (ZOFRAN-ODT) 4 MG disintegrating  "tablet Place 1 tablet on the tongue 4 (Four) Times a Day As Needed for Nausea or Vomiting for up to 6 doses. 2/18/24 4/1/24  Pamela Urbano APRN        Social History:   Social History     Tobacco Use    Smoking status: Former     Current packs/day: 0.50     Types: Cigarettes     Passive exposure: Past    Smokeless tobacco: Never   Vaping Use    Vaping status: Every Day    Substances: Nicotine, Flavoring   Substance Use Topics    Alcohol use: Never    Drug use: Not Currently     Types: Methamphetamines, IV     Comment: former user last use 5/3/22         Review of Systems:  Review of Systems   Constitutional:  Positive for fatigue. Negative for chills and fever.   HENT:  Negative for congestion, ear pain and sore throat.    Eyes:  Negative for pain.   Respiratory:  Negative for cough, chest tightness and shortness of breath.    Cardiovascular:  Negative for chest pain.   Gastrointestinal:  Positive for nausea and vomiting. Negative for abdominal pain and diarrhea.   Genitourinary:  Positive for frequency. Negative for dysuria, flank pain, hematuria and urgency.   Musculoskeletal:  Negative for back pain, joint swelling, neck pain and neck stiffness.   Skin:  Negative for pallor and rash.   Neurological:  Negative for seizures and headaches.   All other systems reviewed and are negative.       Physical Exam:  /74   Pulse 105   Temp 98.9 °F (37.2 °C) (Oral)   Resp 18   Ht 154.9 cm (61\")   Wt 83.2 kg (183 lb 6.8 oz)   LMP 01/10/2024 (Approximate) Comment: 7 weeks  SpO2 99%   BMI 34.66 kg/m²         Physical Exam  Vitals and nursing note reviewed.   Constitutional:       General: She is not in acute distress.     Appearance: Normal appearance. She is not ill-appearing or toxic-appearing.   HENT:      Head: Normocephalic and atraumatic.      Mouth/Throat:      Mouth: Mucous membranes are moist.      Pharynx: Oropharynx is clear.   Eyes:      General: No scleral icterus.     Conjunctiva/sclera: Conjunctivae " normal.      Pupils: Pupils are equal, round, and reactive to light.   Cardiovascular:      Rate and Rhythm: Normal rate and regular rhythm.      Pulses: Normal pulses.   Pulmonary:      Effort: Pulmonary effort is normal. No respiratory distress.      Breath sounds: Normal breath sounds. No wheezing.   Abdominal:      General: Abdomen is flat.      Palpations: Abdomen is soft.      Tenderness: There is no abdominal tenderness. There is no guarding or rebound.   Musculoskeletal:         General: No swelling or tenderness. Normal range of motion.      Cervical back: Normal range of motion.      Right lower leg: No edema.      Left lower leg: No edema.   Skin:     General: Skin is warm and dry.      Capillary Refill: Capillary refill takes less than 2 seconds.      Findings: No rash.   Neurological:      General: No focal deficit present.      Mental Status: She is alert and oriented to person, place, and time. Mental status is at baseline.   Psychiatric:         Mood and Affect: Mood normal.         Behavior: Behavior normal.                  Procedures:  Procedures      Medical Decision Making:      Comorbidities that affect care:    Herpes, hyperlipidemia    External Notes reviewed:    None      The following orders were placed and all results were independently analyzed by me:  Orders Placed This Encounter   Procedures    COVID PRE-OP / PRE-PROCEDURE SCREENING ORDER (NO ISOLATION) - Swab, Nasopharynx    COVID-19, FLU A/B, RSV PCR 1 HR TAT - Swab, Nasopharynx    Pregnancy, Urine - Urine, Clean Catch    Urinalysis With Culture If Indicated - Urine, Clean Catch    Please perform fetal heart tones and record readings.  Nursing Communication    Please p.o. challenge at this time if she is not vomiting.  Nursing Communication       Medications Given in the Emergency Department:  Medications   ondansetron ODT (ZOFRAN-ODT) disintegrating tablet 4 mg (4 mg Oral Given 4/1/24 3939)        ED Course:    The patient was  initially evaluated in the triage area where orders were placed. The patient was later dispositioned by TRU Carmona.      The patient was advised to stay for completion of workup which includes but is not limited to communication of labs and radiological results, reassessment and plan. The patient was advised that leaving prior to disposition by a provider could result in critical findings that are not communicated to the patient.     ED Course as of 04/01/24 2305 Mon Apr 01, 2024   2030 The patient is drinking water and tolerating fluids without any difficulty whatsoever.  No episodes of vomiting in the emergency department today. [TC]      ED Course User Index  [TC] Anamaria Trevino APRN       Labs:    Lab Results (last 24 hours)       Procedure Component Value Units Date/Time    COVID PRE-OP / PRE-PROCEDURE SCREENING ORDER (NO ISOLATION) - Swab, Nasopharynx [[id number]]  (Normal) Collected: 04/01/24 1705    Specimen: Swab from Nasopharynx Updated: 04/01/24 1758    Narrative:      The following orders were created for panel order COVID PRE-OP / PRE-PROCEDURE SCREENING ORDER (NO ISOLATION) - Swab, Nasopharynx.  Procedure                               Abnormality         Status                     ---------                               -----------         ------                     COVID-19, FLU A/B, RSV P...[[id number]]  Normal              Final result                 Please view results for these tests on the individual orders.    COVID-19, FLU A/B, RSV PCR 1 HR TAT - Swab, Nasopharynx [[id number]]  (Normal) Collected: 04/01/24 1705    Specimen: Swab from Nasopharynx Updated: 04/01/24 1758     COVID19 Not Detected     Influenza A PCR Not Detected     Influenza B PCR Not Detected     RSV, PCR Not Detected    Narrative:      Fact sheet for providers: https://www.fda.gov/media/937050/download    Fact sheet for patients: https://www.fda.gov/media/913618/download    Test performed by PCR.    Pregnancy, Urine -  Urine, Clean Catch [[id number]]  (Abnormal) Collected: 04/01/24 1859    Specimen: Urine, Clean Catch Updated: 04/01/24 1910     HCG, Urine QL Positive    Narrative:      Sensitive immunoassays may demonstrate false positive results  with specimens containing heterophilic antibodies. Assays may  also exhibit false-positive or false-negative results with  specimens containing human anti-mouse antibodies. These   specimens may come from patients receving preparations of  mouse monoclonal antibodies for diagnosis or therapy or having  been exposed to mice. If the qualitative interpretation is  inconsistent with the clinical evaluation, results should be   confirmed by an alternate hCG method, ie. quantitative hCG.  As with all urine pregnancy test, this test does not reliably  detect hCG degradation products, including free-beta hCG and  beta core fragments.    Urinalysis With Culture If Indicated - Urine, Clean Catch [[id number]]  (Abnormal) Collected: 04/01/24 1859    Specimen: Urine, Clean Catch Updated: 04/01/24 1913     Color, UA Yellow     Appearance, UA Clear     pH, UA 5.5     Specific Gravity, UA 1.028     Glucose, UA Negative     Ketones, UA 80 mg/dL (3+)     Bilirubin, UA Negative     Blood, UA Negative     Protein, UA Negative     Leuk Esterase, UA Negative     Nitrite, UA Negative     Urobilinogen, UA 1.0 E.U./dL    Narrative:      In absence of clinical symptoms, the presence of pyuria, bacteria, and/or nitrites on the urinalysis result does not correlate with infection.  Urine microscopic not indicated.             Imaging:    No Radiology Exams Resulted Within Past 24 Hours      Differential Diagnosis and Discussion:      Vomiting: Differential diagnosis includes but is not limited to migraine, labyrinthine disorders, psychogenic, metabolic and endocrine causes, peptic ulcer, gastric outlet obstruction, gastritis, gastroenteritis, appendicitis, intestinal obstruction, paralytic ileus, food poisoning,  cholecystitis, acute hepatitis, acute pancreatitis, acute febrile illness, and myocardial infarction.    All labs were reviewed and interpreted by me.    MDM  Number of Diagnoses or Management Options  13 weeks gestation of pregnancy: minor  Body aches: new and requires workup  Nausea and vomiting, unspecified vomiting type: established and worsening     Amount and/or Complexity of Data Reviewed  Clinical lab tests: reviewed    Risk of Complications, Morbidity, and/or Mortality  Presenting problems: low  Diagnostic procedures: low  Management options: low    Patient Progress  Patient progress: stable         Patient Care Considerations:    ANTIBIOTICS: I considered prescribing antibiotics as an outpatient however no bacterial focus of infection was found.      Consultants/Shared Management Plan:    None    Social Determinants of Health:    Patient is independent, reliable, and has access to care.       Disposition and Care Coordination:    Discharged: The patient is suitable and stable for discharge with no need for consideration of admission.    I have explained the patient´s condition, diagnoses and treatment plan based on the information available to me at this time. I have answered questions and addressed any concerns. The patient has a good  understanding of the patient´s diagnosis, condition, and treatment plan as can be expected at this point. The vital signs have been stable. The patient´s condition is stable and appropriate for discharge from the emergency department.      The patient will pursue further outpatient evaluation with the primary care physician or other designated or consulting physician as outlined in the discharge instructions. They are agreeable to this plan of care and follow-up instructions have been explained in detail. The patient has received these instructions in written format and has expressed an understanding of the discharge instructions. The patient is aware that any significant  change in condition or worsening of symptoms should prompt an immediate return to this or the closest emergency department or call to 911.  I have explained discharge medications and the need for follow up with the patient/caretakers. This was also printed in the discharge instructions. Patient was discharged with the following medications and follow up:      Medication List      No changes were made to your prescriptions during this visit.      Odalis Lund MD  [address]  [place] [address]  [phone]    Call   AS SCHEDULED    Yahaira Lauren, TRU  [address]  [place] [address]  [phone]    Call   As needed, FOR FOLLOW UP       Final diagnoses:   Nausea and vomiting, unspecified vomiting type   Body aches   13 weeks gestation of pregnancy        ED Disposition       ED Disposition   Discharge    Condition   Stable    Comment   --               This medical record created using voice recognition software.             Anamaria Trevino, TRU  04/01/24 0799

## 2024-04-01 NOTE — Clinical Note
[facility] EMERGENCY ROOM  [address] [place] [address]  Phone: [phone]  Fax: [phone]    Bekah Rubin was seen and treated in our emergency department on 4/1/2024.  She may return to work on 04/03/2024.         Thank you for choosing [facility].    Anamaria Trevino APRN

## 2024-04-02 NOTE — DISCHARGE INSTRUCTIONS
Rest, drink plenty of fluids.  Clear liquid diet for 24 hours and advance as tolerated to a bland diet this is improved.  Continue with your home Zofran as needed for nausea and vomiting.  You may take over-the-counter acetaminophen as needed for aches and pains.  Call Dr. ESCOBEDO tomorrow and advise her of your ER visit so she may review your test results and follow-up with her as scheduled or as directed.  Follow-up with Ms. Christina OTT in 2 to 3 days to ensure that your symptoms are improving.  Return to the emergency department immediately for any acutely developing abdominal pain, any persistent vomiting, any persistent vaginal bleeding greater than 1 saturated pad per hour or any new or worse concerns.

## 2024-08-10 ENCOUNTER — HOSPITAL ENCOUNTER (INPATIENT)
Facility: HOSPITAL | Age: 24
LOS: 2 days | Discharge: HOME | DRG: 832 | End: 2024-08-12
Attending: OBSTETRICS & GYNECOLOGY | Admitting: OBSTETRICS & GYNECOLOGY
Payer: COMMERCIAL

## 2024-08-10 PROBLEM — N12 PYELONEPHRITIS: Status: ACTIVE | Noted: 2024-08-10

## 2024-08-10 LAB
ALBUMIN SERPL-MCNC: 3.5 G/DL (ref 3.5–5.2)
ALBUMIN/GLOB SERPL: 1.1 G/DL
ALP SERPL-CCNC: 136 U/L (ref 39–117)
ALT SERPL W P-5'-P-CCNC: 11 U/L (ref 1–33)
ANION GAP SERPL CALCULATED.3IONS-SCNC: 13.6 MMOL/L (ref 5–15)
AST SERPL-CCNC: 14 U/L (ref 1–32)
BACTERIA UR QL AUTO: ABNORMAL /HPF
BILIRUB BLD-MCNC: NEGATIVE MG/DL
BILIRUB SERPL-MCNC: 0.2 MG/DL (ref 0–1.2)
BILIRUB UR QL STRIP: NEGATIVE
BUN SERPL-MCNC: 4 MG/DL (ref 6–20)
BUN/CREAT SERPL: 9.1 (ref 7–25)
CALCIUM SPEC-SCNC: 9.2 MG/DL (ref 8.6–10.5)
CHLORIDE SERPL-SCNC: 101 MMOL/L (ref 98–107)
CLARITY UR: ABNORMAL
CLARITY, POC: CLEAR
CO2 SERPL-SCNC: 20.4 MMOL/L (ref 22–29)
COLOR UR: ABNORMAL
COLOR UR: ABNORMAL
CREAT SERPL-MCNC: 0.44 MG/DL (ref 0.57–1)
D-LACTATE SERPL-SCNC: 1.1 MMOL/L (ref 0.5–2)
DEPRECATED RDW RBC AUTO: 43.5 FL (ref 37–54)
EGFRCR SERPLBLD CKD-EPI 2021: 139.6 ML/MIN/1.73
ERYTHROCYTE [DISTWIDTH] IN BLOOD BY AUTOMATED COUNT: 13.7 % (ref 12.3–15.4)
GLOBULIN UR ELPH-MCNC: 3.1 GM/DL
GLUCOSE SERPL-MCNC: 100 MG/DL (ref 65–99)
GLUCOSE UR STRIP-MCNC: NEGATIVE MG/DL
GLUCOSE UR STRIP-MCNC: NEGATIVE MG/DL
HCT VFR BLD AUTO: 34.3 % (ref 34–46.6)
HGB BLD-MCNC: 11.7 G/DL (ref 12–15.9)
HGB UR QL STRIP.AUTO: ABNORMAL
HYALINE CASTS UR QL AUTO: ABNORMAL /LPF
KETONES UR QL STRIP: NEGATIVE
KETONES UR QL: NEGATIVE
LEUKOCYTE EST, POC: ABNORMAL
LEUKOCYTE ESTERASE UR QL STRIP.AUTO: ABNORMAL
MCH RBC QN AUTO: 29.8 PG (ref 26.6–33)
MCHC RBC AUTO-ENTMCNC: 34.1 G/DL (ref 31.5–35.7)
MCV RBC AUTO: 87.3 FL (ref 79–97)
NITRITE UR QL STRIP: POSITIVE
NITRITE UR-MCNC: POSITIVE MG/ML
PH UR STRIP.AUTO: 6.5 [PH] (ref 5–8)
PH UR: 6 [PH] (ref 5–8)
PLATELET # BLD AUTO: 150 10*3/MM3 (ref 140–450)
PMV BLD AUTO: 11.3 FL (ref 6–12)
POTASSIUM SERPL-SCNC: 3.9 MMOL/L (ref 3.5–5.2)
PROT SERPL-MCNC: 6.6 G/DL (ref 6–8.5)
PROT UR QL STRIP: ABNORMAL
PROT UR STRIP-MCNC: ABNORMAL MG/DL
RBC # BLD AUTO: 3.93 10*6/MM3 (ref 3.77–5.28)
RBC # UR STRIP: ABNORMAL /HPF
RBC # UR STRIP: ABNORMAL /UL
REF LAB TEST METHOD: ABNORMAL
SODIUM SERPL-SCNC: 135 MMOL/L (ref 136–145)
SP GR UR STRIP: 1.02 (ref 1–1.03)
SP GR UR: 1.02 (ref 1–1.03)
SQUAMOUS #/AREA URNS HPF: ABNORMAL /HPF
UROBILINOGEN UR QL STRIP: ABNORMAL
UROBILINOGEN UR QL: ABNORMAL
WBC # UR STRIP: ABNORMAL /HPF
WBC NRBC COR # BLD AUTO: 14.03 10*3/MM3 (ref 3.4–10.8)

## 2024-08-10 PROCEDURE — 25810000003 LACTATED RINGERS PER 1000 ML: Performed by: OBSTETRICS & GYNECOLOGY

## 2024-08-10 PROCEDURE — 85027 COMPLETE CBC AUTOMATED: CPT | Performed by: OBSTETRICS & GYNECOLOGY

## 2024-08-10 PROCEDURE — 83605 ASSAY OF LACTIC ACID: CPT | Performed by: OBSTETRICS & GYNECOLOGY

## 2024-08-10 PROCEDURE — G0378 HOSPITAL OBSERVATION PER HR: HCPCS

## 2024-08-10 PROCEDURE — 25010000002 CEFTRIAXONE PER 250 MG: Performed by: OBSTETRICS & GYNECOLOGY

## 2024-08-10 PROCEDURE — 87086 URINE CULTURE/COLONY COUNT: CPT | Performed by: OBSTETRICS & GYNECOLOGY

## 2024-08-10 PROCEDURE — 87077 CULTURE AEROBIC IDENTIFY: CPT | Performed by: OBSTETRICS & GYNECOLOGY

## 2024-08-10 PROCEDURE — 81001 URINALYSIS AUTO W/SCOPE: CPT | Performed by: OBSTETRICS & GYNECOLOGY

## 2024-08-10 PROCEDURE — 80053 COMPREHEN METABOLIC PANEL: CPT | Performed by: OBSTETRICS & GYNECOLOGY

## 2024-08-10 PROCEDURE — 87186 SC STD MICRODIL/AGAR DIL: CPT | Performed by: OBSTETRICS & GYNECOLOGY

## 2024-08-10 PROCEDURE — 87040 BLOOD CULTURE FOR BACTERIA: CPT | Performed by: OBSTETRICS & GYNECOLOGY

## 2024-08-10 RX ORDER — SODIUM CHLORIDE, SODIUM LACTATE, POTASSIUM CHLORIDE, CALCIUM CHLORIDE 600; 310; 30; 20 MG/100ML; MG/100ML; MG/100ML; MG/100ML
125 INJECTION, SOLUTION INTRAVENOUS CONTINUOUS
Status: DISCONTINUED | OUTPATIENT
Start: 2024-08-10 | End: 2024-08-12 | Stop reason: HOSPADM

## 2024-08-10 RX ORDER — SODIUM CHLORIDE 0.9 % (FLUSH) 0.9 %
10 SYRINGE (ML) INJECTION EVERY 12 HOURS SCHEDULED
Status: DISCONTINUED | OUTPATIENT
Start: 2024-08-10 | End: 2024-08-10 | Stop reason: HOSPADM

## 2024-08-10 RX ORDER — SODIUM CHLORIDE 9 MG/ML
40 INJECTION, SOLUTION INTRAVENOUS AS NEEDED
Status: DISCONTINUED | OUTPATIENT
Start: 2024-08-10 | End: 2024-08-12 | Stop reason: HOSPADM

## 2024-08-10 RX ORDER — SODIUM CHLORIDE 0.9 % (FLUSH) 0.9 %
10 SYRINGE (ML) INJECTION AS NEEDED
Status: DISCONTINUED | OUTPATIENT
Start: 2024-08-10 | End: 2024-08-12 | Stop reason: HOSPADM

## 2024-08-10 RX ORDER — ASPIRIN 81 MG/1
81 TABLET, CHEWABLE ORAL DAILY
Status: DISCONTINUED | OUTPATIENT
Start: 2024-08-10 | End: 2024-08-12 | Stop reason: HOSPADM

## 2024-08-10 RX ORDER — ONDANSETRON 2 MG/ML
4 INJECTION INTRAMUSCULAR; INTRAVENOUS EVERY 6 HOURS PRN
Status: DISCONTINUED | OUTPATIENT
Start: 2024-08-10 | End: 2024-08-12 | Stop reason: HOSPADM

## 2024-08-10 RX ORDER — FERROUS SULFATE 325(65) MG
325 TABLET ORAL 3 TIMES WEEKLY
COMMUNITY

## 2024-08-10 RX ORDER — HYDROCODONE BITARTRATE AND ACETAMINOPHEN 5; 325 MG/1; MG/1
1 TABLET ORAL EVERY 4 HOURS PRN
Status: DISCONTINUED | OUTPATIENT
Start: 2024-08-10 | End: 2024-08-12 | Stop reason: HOSPADM

## 2024-08-10 RX ORDER — FERROUS SULFATE 325(65) MG
325 TABLET ORAL 3 TIMES WEEKLY
Status: DISCONTINUED | OUTPATIENT
Start: 2024-08-12 | End: 2024-08-12 | Stop reason: HOSPADM

## 2024-08-10 RX ORDER — SODIUM CHLORIDE 9 MG/ML
40 INJECTION, SOLUTION INTRAVENOUS AS NEEDED
Status: DISCONTINUED | OUTPATIENT
Start: 2024-08-10 | End: 2024-08-10 | Stop reason: HOSPADM

## 2024-08-10 RX ORDER — SODIUM CHLORIDE 0.9 % (FLUSH) 0.9 %
10 SYRINGE (ML) INJECTION AS NEEDED
Status: DISCONTINUED | OUTPATIENT
Start: 2024-08-10 | End: 2024-08-10 | Stop reason: HOSPADM

## 2024-08-10 RX ORDER — SODIUM CHLORIDE 0.9 % (FLUSH) 0.9 %
10 SYRINGE (ML) INJECTION EVERY 12 HOURS SCHEDULED
Status: DISCONTINUED | OUTPATIENT
Start: 2024-08-10 | End: 2024-08-12 | Stop reason: HOSPADM

## 2024-08-10 RX ORDER — ACETAMINOPHEN 325 MG/1
650 TABLET ORAL EVERY 4 HOURS PRN
Status: DISCONTINUED | OUTPATIENT
Start: 2024-08-10 | End: 2024-08-12 | Stop reason: HOSPADM

## 2024-08-10 RX ORDER — PRENATAL VIT/IRON FUM/FOLIC AC 27MG-0.8MG
1 TABLET ORAL DAILY
Status: DISCONTINUED | OUTPATIENT
Start: 2024-08-10 | End: 2024-08-12 | Stop reason: HOSPADM

## 2024-08-10 RX ADMIN — HYDROCODONE BITARTRATE AND ACETAMINOPHEN 1 TABLET: 5; 325 TABLET ORAL at 18:14

## 2024-08-10 RX ADMIN — CEFTRIAXONE SODIUM 2000 MG: 2 INJECTION, POWDER, FOR SOLUTION INTRAMUSCULAR; INTRAVENOUS at 10:15

## 2024-08-10 RX ADMIN — SODIUM CHLORIDE, POTASSIUM CHLORIDE, SODIUM LACTATE AND CALCIUM CHLORIDE 125 ML/HR: 600; 310; 30; 20 INJECTION, SOLUTION INTRAVENOUS at 09:05

## 2024-08-10 NOTE — NON STRESS TEST
Obstetrical Non-stress Test Interpretation     Name:  Bekah Rubin  MRN: [id number]    24 y.o. female  at 31w1d    Indication: OB triage back pain      Fetal Assessment  Fetal Movement: active  Fetal HR Assessment Method: external  Fetal HR (beats/min): 130  Fetal HR Baseline: normal range  Fetal HR Variability: moderate (amplitude range 6 to 25 bpm)  Fetal HR Accelerations: greater than/equal to 10 bpm (32 wks gest or less), lasts at least 10 seconds (32 wks gest or less)  Fetal HR Decelerations: absent  Sinusoidal Pattern Present: absent    /72 (BP Location: Right arm, Patient Position: Sitting)   Pulse (!) 121   Temp 98.3 °F (36.8 °C) (Oral)   Resp 16   LMP 01/10/2024 (Approximate) Comment: 7 weeks    Reason for test: OB Triage  Date of Test: 8/10/2024  Time frame of test: 7707-8361  RN NST Interpretation: Reactive      Marlin Hernandez RN  8/10/2024  14:23 EDT

## 2024-08-10 NOTE — H&P
GEORGINA Gilbert  Obstetric History and Physical    Chief Complaint   Patient presents with    Back Pain    Abdominal Pain    Vaginal Pain       Subjective     Patient is a 24 y.o. female  currently at 31w1d, who presents with complaint of back pain and lower abdominal pain.  Patient reports that her back pain is on the left side.  Patient reports dysuria urgency and frequency.  She denies any fever or chills.  She denies any vaginal bleeding or loss of fluid.  Positive fetal movement.    PNC provided by:  [facility]. Her prenatal care is benign.  Her previous obstetric/gynecological history is noted for is non-contributory.    The following portions of the patients history were reviewed and updated as appropriate: current medications, allergies, past medical history, past surgical history, past family history, and past social history .       Prenatal Information:  Prenatal Results       Initial Prenatal Labs       Test Value Reference Range Date Time    Hemoglobin  15.2 g/dL 12.0 - 15.9 24    Hematocrit  45.2 % 34.0 - 46.6 24    Platelets  276 10*3/mm3 140 - 450 24    Rubella IgG        Hepatitis B SAg        Hepatitis C Ab        RPR        T. Pallidum Ab         ABO  O   23    Rh  Positive   23    Antibody Screen        HIV        Urine Culture  <25,000 CFU/mL Mixed Brandy Isolated   24 1855    Gonorrhea        Chlamydia        TSH        HgB A1c         Varicella IgG        Hemoglobinopathy Fractionation        Hemoglobinopathy (genetic testing)        Cystic fibrosis                   Fetal testing        Test Value Reference Range Date Time    NIPT        MSAFP        AFP-4                  2nd and 3rd Trimester       Test Value Reference Range Date Time    Hemoglobin (repeated)  11.7 g/dL 12.0 - 15.9 08/10/24 0901    Hematocrit (repeated)  34.3 % 34.0 - 46.6 08/10/24 09    Platelets   150 10*3/mm3 140 - 450 08/10/24 0901       276 10*3/mm3 140 - 450  02/17/24 2306    1 hour GTT         Antibody Screen (repeated)        3rd TM syphilis scrn (repeated)  RPR         3rd TM syphilis scrn (repeated) TP-Ab        3rd TM syphilis screen TB-Ab (FTA)        Syphilis cascade test TP-Ab (EIA)        Syphilis cascade TPPA        GTT Fasting        GTT 1 Hr        GTT 2 Hr        GTT 3 Hr        Group B Strep                  Other testing        Test Value Reference Range Date Time    Parvo IgG         CMV IgG                   Drug Screening       Test Value Reference Range Date Time    Amphetamine Screen        Barbiturate Screen        Benzodiazepine Screen        Methadone Screen        Phencyclidine Screen        Opiates Screen        THC Screen        Cocaine Screen        Propoxyphene Screen        Buprenorphine Screen        Methamphetamine Screen        Oxycodone Screen        Tricyclic Antidepressants Screen                  Legend    ^: Historical                          External Prenatal Results       Pregnancy Outside Results - Transcribed From Office Records - See Scanned Records For Details       Test Value Date Time    ABO  O  03/28/23 2304    Rh  Positive  03/28/23 2304    Antibody Screen       Varicella IgG       Rubella       Hgb  11.7 g/dL 08/10/24 0901       15.2 g/dL 02/17/24 2306    Hct  34.3 % 08/10/24 0901       45.2 % 02/17/24 2306    HgB A1c        1h GTT       3h GTT Fasting       3h GTT 1 hour       3h GTT 2 hour       3h GTT 3 hour        Gonorrhea (discrete)       Chlamydia (discrete)       RPR       Syphils cascade: TP-Ab (FTA)       TP-Ab       TP-Ab (EIA)       TPPA       HBsAg       Herpes Simplex Virus PCR       Herpes Simplex VIrus Culture       HIV       Hep C RNA Quant PCR       Hep C Antibody       AFP       NIPT       Cystic Fibroisis        Group B Strep       GBS Susceptibility to Clindamycin       GBS Susceptibility to Erythromycin       Fetal Fibronectin       Genetic Testing, Maternal Blood                 Drug Screening        Test Value Date Time    Urine Drug Screen       Amphetamine Screen       Barbiturate Screen       Benzodiazepine Screen       Methadone Screen       Phencyclidine Screen       Opiates Screen       THC Screen       Cocaine Screen       Propoxyphene Screen       Buprenorphine Screen       Methamphetamine Screen       Oxycodone Screen       Tricyclic Antidepressants Screen                 Legend    ^: Historical                             Past OB History:     OB History    Para Term  AB Living   2 0 0 0 0 0   SAB IAB Ectopic Molar Multiple Live Births   0 0 0 0 0 0      # Outcome Date GA Lbr Kevin/2nd Weight Sex Type Anes PTL Lv   2 Current            1                 Past Medical History: Past Medical History:   Diagnosis Date    Herpes     Herpes     Hyperlipidemia       Past Surgical History No past surgical history on file.   Family History: No family history on file.   Social History:  reports that she has quit smoking. Her smoking use included cigarettes. She has been exposed to tobacco smoke. She has never used smokeless tobacco.   reports no history of alcohol use.   reports that she does not currently use drugs after having used the following drugs: Methamphetamines and IV.        General ROS: Pertinent items are noted in HPI    Objective       Vital Signs Range for the last 24 hours  Temperature: Temp:  [98.3 °F (36.8 °C)] 98.3 °F (36.8 °C)   Temp Source: Temp src: Oral   BP: BP: (121)/(72) 121/72   Pulse: Heart Rate:  [121] 121   Respirations:     SPO2:     O2 Amount (l/min):     O2 Devices     Weight:       Physical Examination: General appearance - alert, well appearing, and in no distress  Mental status - alert, oriented to person, place, and time  Chest - clear to auscultation, no wheezes, rales or rhonchi, symmetric air entry  Heart - normal rate, regular rhythm, normal S1, S2, no murmurs, rubs, clicks or gallops  Abdomen - soft, nontender, nondistended, no masses or  organomegaly  Back exam -left-sided CVA tenderness  Extremities - peripheral pulses normal, no pedal edema, no clubbing or cyanosis  Skin - normal coloration and turgor, no rashes, no suspicious skin lesions noted    Presentation: Unknown   Cervix: Exam by: Method: sterile exam per RN   Dilation: Cervical Dilation (cm): 0   Effacement: Cervical Effacement: 0%   Station:         Fetal Heart Rate Assessment   Method:     Beats/min:     Baseline:     Variability:     Accels:     Decels:           Uterine Assessment   Method:     Frequency (min):     Ctx Count in 10 min:     Duration:     Intensity:         Mayo Units:       GBS is unknown      Assessment & Plan       Pyelonephritis        Assessment:  1.  Intrauterine pregnancy at 31w1d gestation with reactive fetal status.    2.  IUP at 31 weeks estimate gestational age with suspected pyelonephritis.  Patient has tachycardia but is not septic.    Plan:  Admit to labor and delivery  Start Rocephin 2 grams daily  IV fluid  Tylenol as needed for pain and fever  Await final urine culture results  I discussed the plan with patient and she was agreeable.  All questions were answered.    Daren Freeman MD  8/10/2024  10:02 EDT

## 2024-08-11 LAB
BASOPHILS # BLD AUTO: 0.03 10*3/MM3 (ref 0–0.2)
BASOPHILS NFR BLD AUTO: 0.3 % (ref 0–1.5)
DEPRECATED RDW RBC AUTO: 45.7 FL (ref 37–54)
EOSINOPHIL # BLD AUTO: 0.05 10*3/MM3 (ref 0–0.4)
EOSINOPHIL NFR BLD AUTO: 0.5 % (ref 0.3–6.2)
ERYTHROCYTE [DISTWIDTH] IN BLOOD BY AUTOMATED COUNT: 14 % (ref 12.3–15.4)
HCT VFR BLD AUTO: 32.2 % (ref 34–46.6)
HGB BLD-MCNC: 10.7 G/DL (ref 12–15.9)
HOLD SPECIMEN: NORMAL
IMM GRANULOCYTES # BLD AUTO: 0.09 10*3/MM3 (ref 0–0.05)
IMM GRANULOCYTES NFR BLD AUTO: 0.8 % (ref 0–0.5)
LYMPHOCYTES # BLD AUTO: 1.46 10*3/MM3 (ref 0.7–3.1)
LYMPHOCYTES NFR BLD AUTO: 13.7 % (ref 19.6–45.3)
MCH RBC QN AUTO: 30.1 PG (ref 26.6–33)
MCHC RBC AUTO-ENTMCNC: 33.2 G/DL (ref 31.5–35.7)
MCV RBC AUTO: 90.7 FL (ref 79–97)
MONOCYTES # BLD AUTO: 0.81 10*3/MM3 (ref 0.1–0.9)
MONOCYTES NFR BLD AUTO: 7.6 % (ref 5–12)
NEUTROPHILS NFR BLD AUTO: 77.1 % (ref 42.7–76)
NEUTROPHILS NFR BLD AUTO: 8.2 10*3/MM3 (ref 1.7–7)
NRBC BLD AUTO-RTO: 0 /100 WBC (ref 0–0.2)
PLATELET # BLD AUTO: 142 10*3/MM3 (ref 140–450)
PMV BLD AUTO: 11.1 FL (ref 6–12)
RBC # BLD AUTO: 3.55 10*6/MM3 (ref 3.77–5.28)
WBC NRBC COR # BLD AUTO: 10.64 10*3/MM3 (ref 3.4–10.8)

## 2024-08-11 PROCEDURE — G0378 HOSPITAL OBSERVATION PER HR: HCPCS

## 2024-08-11 PROCEDURE — 25010000002 CEFTRIAXONE PER 250 MG: Performed by: OBSTETRICS & GYNECOLOGY

## 2024-08-11 PROCEDURE — 85025 COMPLETE CBC W/AUTO DIFF WBC: CPT | Performed by: OBSTETRICS & GYNECOLOGY

## 2024-08-11 PROCEDURE — 25810000003 LACTATED RINGERS PER 1000 ML: Performed by: OBSTETRICS & GYNECOLOGY

## 2024-08-11 RX ADMIN — PRENATAL WITH FERROUS FUM AND FOLIC ACID 1 TABLET: 3080; 920; 120; 400; 22; 1.84; 3; 20; 10; 1; 12; 200; 27; 25; 2 TABLET ORAL at 09:57

## 2024-08-11 RX ADMIN — ASPIRIN 81 MG: 81 TABLET, CHEWABLE ORAL at 13:35

## 2024-08-11 RX ADMIN — CEFTRIAXONE SODIUM 2000 MG: 2 INJECTION, POWDER, FOR SOLUTION INTRAMUSCULAR; INTRAVENOUS at 10:16

## 2024-08-11 RX ADMIN — SODIUM CHLORIDE, POTASSIUM CHLORIDE, SODIUM LACTATE AND CALCIUM CHLORIDE 125 ML/HR: 600; 310; 30; 20 INJECTION, SOLUTION INTRAVENOUS at 18:31

## 2024-08-11 RX ADMIN — SODIUM CHLORIDE, POTASSIUM CHLORIDE, SODIUM LACTATE AND CALCIUM CHLORIDE 125 ML/HR: 600; 310; 30; 20 INJECTION, SOLUTION INTRAVENOUS at 10:16

## 2024-08-11 NOTE — PLAN OF CARE
Goal Outcome Evaluation:  Plan of Care Reviewed With: patient           Outcome Evaluation: Feeling much better today. No back pain today. Has occasional pain after voiding at uretheral. Voiding 300-500 ml urine each voiding. Afebrile. Nst reactive. Denies any abd. tenderness, cramping or contractions. Tolerates diet well.

## 2024-08-11 NOTE — PLAN OF CARE
Goal Outcome Evaluation:           Progress: improving  Outcome Evaluation: VSS (heart rate 100-104 improved from 120 on previous shift). Rested well this night. Reports pain and burning with urination. I/O monitored and UOP well. NST reactive this shift and no contractions noted. Tolerating PO fluids and solids well with no n/v.

## 2024-08-11 NOTE — NON STRESS TEST
Obstetrical Non-stress Test Interpretation     Name:  Bekah Rubin  MRN: [id number]    24 y.o. female  at 31w1d    Indication: Pyelo      Fetal Assessment  Fetal Movement: active  Fetal HR Assessment Method: external  Fetal HR (beats/min): 135  Fetal HR Baseline: normal range  Fetal HR Variability: moderate (amplitude range 6 to 25 bpm)  Fetal HR Accelerations: episodic, lasting at least 15 seconds, greater than/equal to 15 bpm  Fetal HR Decelerations: absent  Sinusoidal Pattern Present: absent    /71 (BP Location: Left arm, Patient Position: Sitting)   Pulse 107   Temp 97.7 °F (36.5 °C) (Oral)   Resp 16   LMP 01/10/2024 (Approximate) Comment: 7 weeks    Reason for test: Antepartum  Date of Test: 8/10/2024  Time frame of test: 25 minutes  RN NST Interpretation: Reactive      Deja Huitron RN  8/10/2024  23:24 EDT

## 2024-08-11 NOTE — PROGRESS NOTES
GEORGINA Gilbert  Obstetric Progress Note    Subjective     Patient:    The patient reports she is feeling much better today.  Patient reports her dysuria has largely resolved.  Patient also reports that her left CVA tenderness has resolved.  Patient denies fevers/chills.  Patient reports positive fetal movement, denies vaginal bleeding, denies leakage of fluid, and denies lower abdominal cramping/pain.    Objective     Vital Signs Range for the last 24 hours  Temp:  [97.7 °F (36.5 °C)-98.9 °F (37.2 °C)] 98.8 °F (37.1 °C)   Temp src: Oral   BP: (102-124)/(50-79) 102/50   Heart Rate:  [] 99   Resp:  [16-18] 16            Intake/Output Summary (Last 24 hours) at 8/11/2024 0919  Last data filed at 8/11/2024 0631  Gross per 24 hour   Intake 2695 ml   Output 2800 ml   Net -105 ml       No intake/output data recorded.    Physical Exam:  General:  Back:  Abdomen: Patient is well appearing  Minimal left CVA tenderness to palpation  Soft, gravid, nontender           Cervix: Method: sterile exam per RN   Dilation: Cervical Dilation (cm): 0   Effacement: Cervical Effacement: 0%   Station:       Fetal Heart Rate Assessment   Method: Fetal HR Assessment Method: external   Beats/min: Fetal HR (beats/min): 135   Baseline: Fetal HR Baseline: normal range   Variability: Fetal HR Variability: moderate (amplitude range 6 to 25 bpm)   Accels: Fetal HR Accelerations: episodic, lasting at least 15 seconds, greater than/equal to 15 bpm   Decels: Fetal HR Decelerations: absent     Uterine Assessment   Method: Method: palpation, external tocotransducer   Frequency (min):     Ctx Count in 10 min:     Duration:     Intensity: Contraction Intensity: no contractions   Intensity by IUPC:     Resting Tone: Uterine Resting Tone: soft by palpation   Resting Tone by IUPC:     [place]strip Units:       CBC:      Lab 08/10/24  0901   WBC 14.03*   HEMOGLOBIN 11.7*   HEMATOCRIT 34.3   PLATELETS 150   MCV 87.3         Assessment & Plan        Pyelonephritis        Assessment:  1.  Intrauterine pregnancy at 31w2d gestation with reactive fetal status.    2.  Pyelonephritis    Plan:  FWB:  -Continue NST twice daily    2.  Pyelonephritis  - Patient has been afebrile since admission  - WBC yesterday 14, will repeat CBC today  - Urinalysis with large blood, 1+ protein, large leuk esterase, positive nitrites,   - Urine culture pending, blood cultures pending  - Will continue ceftriaxone 2 g IV every 24 hours along with LR at 125 cc/h  - Patient reports improvement following the first dose of ceftriaxone  - Will continue to monitor closely      Plan of care has been reviewed with patient   All questions have been answered.      Electronically signed by Miranda Mendoza MD, 08/11/24, 9:19 AM EDT.

## 2024-08-11 NOTE — NON STRESS TEST
Obstetrical Non-stress Test Interpretation     Name:  Bekah Rubin  MRN: [id number]    24 y.o. female  at 31w2d    Indication: pyelonephritis       Fetal Movement: active  Fetal HR Assessment Method: external  Fetal HR (beats/min): 135  Fetal HR Baseline: normal range  Fetal HR Variability: moderate (amplitude range 6 to 25 bpm)  Fetal HR Accelerations: greater than/equal to 15 bpm, lasting at least 15 seconds, episodic  Fetal HR Decelerations: absent  Sinusoidal Pattern Present: absent    /60 (BP Location: Left arm, Patient Position: Sitting)   Pulse 114   Temp 97.9 °F (36.6 °C) (Oral)   Resp 18   LMP 01/10/2024 (Approximate) Comment: 7 weeks    Reason for test: Other (Comment) (pyelonephritis)  Date of Test: 2024  Time frame of test:   RN NST Interpretation: Reactive      Jossie Villalba RN  2024  10:25 EDT

## 2024-08-12 VITALS
DIASTOLIC BLOOD PRESSURE: 74 MMHG | RESPIRATION RATE: 18 BRPM | HEART RATE: 105 BPM | SYSTOLIC BLOOD PRESSURE: 112 MMHG | TEMPERATURE: 97.9 F

## 2024-08-12 LAB — BACTERIA SPEC AEROBE CULT: ABNORMAL

## 2024-08-12 PROCEDURE — 25010000002 CEFTRIAXONE PER 250 MG: Performed by: OBSTETRICS & GYNECOLOGY

## 2024-08-12 PROCEDURE — 59025 FETAL NON-STRESS TEST: CPT

## 2024-08-12 PROCEDURE — 25810000003 LACTATED RINGERS PER 1000 ML: Performed by: OBSTETRICS & GYNECOLOGY

## 2024-08-12 RX ORDER — NITROFURANTOIN 25; 75 MG/1; MG/1
100 CAPSULE ORAL DAILY
Qty: 30 CAPSULE | Refills: 0 | Status: SHIPPED | OUTPATIENT
Start: 2024-08-12 | End: 2024-09-11

## 2024-08-12 RX ORDER — NITROFURANTOIN 25; 75 MG/1; MG/1
100 CAPSULE ORAL 2 TIMES DAILY
Qty: 8 CAPSULE | Refills: 0 | Status: SHIPPED | OUTPATIENT
Start: 2024-08-12

## 2024-08-12 RX ADMIN — ACETAMINOPHEN 650 MG: 325 TABLET ORAL at 01:59

## 2024-08-12 RX ADMIN — CEFTRIAXONE SODIUM 2000 MG: 2 INJECTION, POWDER, FOR SOLUTION INTRAMUSCULAR; INTRAVENOUS at 10:37

## 2024-08-12 RX ADMIN — ASPIRIN 81 MG: 81 TABLET, CHEWABLE ORAL at 10:37

## 2024-08-12 RX ADMIN — SODIUM CHLORIDE, POTASSIUM CHLORIDE, SODIUM LACTATE AND CALCIUM CHLORIDE 125 ML/HR: 600; 310; 30; 20 INJECTION, SOLUTION INTRAVENOUS at 02:01

## 2024-08-12 RX ADMIN — PRENATAL WITH FERROUS FUM AND FOLIC ACID 1 TABLET: 3080; 920; 120; 400; 22; 1.84; 3; 20; 10; 1; 12; 200; 27; 25; 2 TABLET ORAL at 10:37

## 2024-08-12 RX ADMIN — FERROUS SULFATE TAB 325 MG (65 MG ELEMENTAL FE) 325 MG: 325 (65 FE) TAB at 10:37

## 2024-08-12 RX ADMIN — SODIUM CHLORIDE, POTASSIUM CHLORIDE, SODIUM LACTATE AND CALCIUM CHLORIDE 125 ML/HR: 600; 310; 30; 20 INJECTION, SOLUTION INTRAVENOUS at 09:20

## 2024-08-12 NOTE — NON STRESS TEST
Obstetrical Non-stress Test Interpretation     Name:  Bekah Rubin  MRN: [id number]    24 y.o. female  at 31w2d    Indication: pyelonephritis      Fetal Assessment  Fetal Movement: active  Fetal HR Assessment Method: external  Fetal HR (beats/min): 125  Fetal HR Baseline: normal range  Fetal HR Variability: moderate (amplitude range 6 to 25 bpm)  Fetal HR Accelerations: episodic, greater than/equal to 10 bpm (32 wks gest or less), lasts at least 10 seconds (32 wks gest or less)  Fetal HR Decelerations: absent  Sinusoidal Pattern Present: absent    /48 (BP Location: Left arm, Patient Position: Sitting)   Pulse 112   Temp 97.7 °F (36.5 °C) (Oral)   Resp 18   LMP 01/10/2024 (Approximate) Comment: 7 weeks    Reason for test: Other (Comment) (pyelonephritis)  Date of Test: 2024  Time frame of test:   RN NST Interpretation: Reactive      Tricia Mckenzie RN  2024  22:25 EDT

## 2024-08-12 NOTE — DISCHARGE SUMMARY
"GYN Discharge Summary      Admit Date:  8/10/2024  Discharge Date: 08/12/24    Reason for Admission:  Pyelonephritis [N12]    Final Diagnosis:  pyelonephritis  To do at FU: none      Hospital Course/Signifcant Findings/Treatment/Procedure performed:  Pt admitted at 31wks with pyelo;  pt received rocephin x 3 doses and d/c home with macrobid to finish course;  pt also d/c with macrobid daily for rest of pregnancy;  urine cx - gm neg bacillus - will f/u on sensitivities    Results from last 7 days   Lab Units 08/11/24  1005 08/10/24  0901   WBC 10*3/mm3 10.64 14.03*   HEMOGLOBIN g/dL 10.7* 11.7*   HEMATOCRIT % 32.2* 34.3   PLATELETS 10*3/mm3 142 150       Results from last 7 days   Lab Units 08/10/24  0901   GLUCOSE mg/dL 100*   CREATININE mg/dL 0.44*   SODIUM mmol/L 135*   POTASSIUM mmol/L 3.9   CHLORIDE mmol/L 101   AST (SGOT) U/L 14   ALT (SGPT) U/L 11       Lab Results   Component Value Date    PROTEINUR Trace 11/09/2023                     No results found for: \"FIBRINOGEN\"    Results for orders placed or performed during the hospital encounter of 08/10/24   Blood Culture - Blood, Arm, Left    Specimen: Arm, Left; Blood   Result Value Ref Range    Blood Culture No growth at 24 hours    Urine Culture - Urine, Urine, Clean Catch    Specimen: Urine, Clean Catch   Result Value Ref Range    Urine Culture >100,000 CFU/mL Gram Negative Bacilli (A)    CBC (No Diff)    Specimen: Blood   Result Value Ref Range    WBC 14.03 (H) 3.40 - 10.80 10*3/mm3    RBC 3.93 3.77 - 5.28 10*6/mm3    Hemoglobin 11.7 (L) 12.0 - 15.9 g/dL    Hematocrit 34.3 34.0 - 46.6 %    MCV 87.3 79.0 - 97.0 fL    MCH 29.8 26.6 - 33.0 pg    MCHC 34.1 31.5 - 35.7 g/dL    RDW 13.7 12.3 - 15.4 %    RDW-SD 43.5 37.0 - 54.0 fl    MPV 11.3 6.0 - 12.0 fL    Platelets 150 140 - 450 10*3/mm3   Comprehensive Metabolic Panel    Specimen: Blood   Result Value Ref Range    Glucose 100 (H) 65 - 99 mg/dL    BUN 4 (L) 6 - 20 mg/dL    Creatinine 0.44 (L) 0.57 - 1.00 mg/dL "    Sodium 135 (L) 136 - 145 mmol/L    Potassium 3.9 3.5 - 5.2 mmol/L    Chloride 101 98 - 107 mmol/L    CO2 20.4 (L) 22.0 - 29.0 mmol/L    Calcium 9.2 8.6 - 10.5 mg/dL    Total Protein 6.6 6.0 - 8.5 g/dL    Albumin 3.5 3.5 - 5.2 g/dL    ALT (SGPT) 11 1 - 33 U/L    AST (SGOT) 14 1 - 32 U/L    Alkaline Phosphatase 136 (H) 39 - 117 U/L    Total Bilirubin 0.2 0.0 - 1.2 mg/dL    Globulin 3.1 gm/dL    A/G Ratio 1.1 g/dL    BUN/Creatinine Ratio 9.1 7.0 - 25.0    Anion Gap 13.6 5.0 - 15.0 mmol/L    eGFR 139.6 >60.0 mL/min/1.73   Urinalysis With Culture If Indicated - Urine, Clean Catch    Specimen: Urine, Clean Catch   Result Value Ref Range    Color, UA Dark Yellow (A) Yellow, Straw    Appearance, UA Turbid (A) Clear    pH, UA 6.5 5.0 - 8.0    Specific Gravity, UA 1.017 1.005 - 1.030    Glucose, UA Negative Negative    Ketones, UA Negative Negative    Bilirubin, UA Negative Negative    Blood, UA Large (3+) (A) Negative    Protein, UA 30 mg/dL (1+) (A) Negative    Leuk Esterase, UA Large (3+) (A) Negative    Nitrite, UA Positive (A) Negative    Urobilinogen, UA 1.0 E.U./dL 0.2 - 1.0 E.U./dL   Lactic Acid, Plasma    Specimen: Arm, Left; Blood   Result Value Ref Range    Lactate 1.1 0.5 - 2.0 mmol/L   Urinalysis, Microscopic Only - Urine, Clean Catch    Specimen: Urine, Clean Catch   Result Value Ref Range    RBC, UA 21-50 (A) None Seen, 0-2 /HPF    WBC, UA 21-50 (A) None Seen, 0-2 /HPF    Bacteria, UA 3+ (A) None Seen /HPF    Squamous Epithelial Cells, UA 3-6 (A) None Seen, 0-2 /HPF    Hyaline Casts, UA 3-6 None Seen /LPF    Methodology Manual Light Microscopy    CBC Auto Differential    Specimen: Hand, Left; Blood   Result Value Ref Range    WBC 10.64 3.40 - 10.80 10*3/mm3    RBC 3.55 (L) 3.77 - 5.28 10*6/mm3    Hemoglobin 10.7 (L) 12.0 - 15.9 g/dL    Hematocrit 32.2 (L) 34.0 - 46.6 %    MCV 90.7 79.0 - 97.0 fL    MCH 30.1 26.6 - 33.0 pg    MCHC 33.2 31.5 - 35.7 g/dL    RDW 14.0 12.3 - 15.4 %    RDW-SD 45.7 37.0 - 54.0 fl     MPV 11.1 6.0 - 12.0 fL    Platelets 142 140 - 450 10*3/mm3    Neutrophil % 77.1 (H) 42.7 - 76.0 %    Lymphocyte % 13.7 (L) 19.6 - 45.3 %    Monocyte % 7.6 5.0 - 12.0 %    Eosinophil % 0.5 0.3 - 6.2 %    Basophil % 0.3 0.0 - 1.5 %    Immature Grans % 0.8 (H) 0.0 - 0.5 %    Neutrophils, Absolute 8.20 (H) 1.70 - 7.00 10*3/mm3    Lymphocytes, Absolute 1.46 0.70 - 3.10 10*3/mm3    Monocytes, Absolute 0.81 0.10 - 0.90 10*3/mm3    Eosinophils, Absolute 0.05 0.00 - 0.40 10*3/mm3    Basophils, Absolute 0.03 0.00 - 0.20 10*3/mm3    Immature Grans, Absolute 0.09 (H) 0.00 - 0.05 10*3/mm3    nRBC 0.0 0.0 - 0.2 /100 WBC   POC Urinalysis Dipstick    Specimen: Urine   Result Value Ref Range    Color Dark Yellow Yellow, Straw, Dark Yellow, Maria E    Clarity, UA Clear Clear    Glucose, UA Negative Negative mg/dL    Bilirubin Negative Negative    Ketones, UA Negative Negative    Specific Gravity  1.025 1.005 - 1.030    Blood, UA Large (A) Negative    pH, Urine 6.0 5.0 - 8.0    Protein, POC 30 mg/dL (A) Negative mg/dL    Urobilinogen, UA 0.2 E.U./dL Normal, 0.2 E.U./dL    Leukocytes Large (3+) (A) Negative    Nitrite, UA Positive (A) Negative   Green Top (Gel)   Result Value Ref Range    Extra Tube Hold for add-ons.           Discharge Medications        New Medications        Instructions Start Date   nitrofurantoin (macrocrystal-monohydrate) 100 MG capsule  Commonly known as: Macrobid   100 mg, Oral, 2 Times Daily      nitrofurantoin (macrocrystal-monohydrate) 100 MG capsule  Commonly known as: Macrobid   100 mg, Oral, Daily             Continue These Medications        Instructions Start Date   aspirin 81 MG chewable tablet   81 mg, Oral, Daily      ferrous sulfate 325 (65 FE) MG tablet   325 mg, Oral, 3 Times Weekly      prenatal (CLASSIC) vitamin 28-0.8 MG tablet tablet  Generic drug: prenatal vitamin   Oral, Daily      valACYclovir 500 MG tablet  Commonly known as: VALTREX   1 tablet, Oral, As Needed                Diet:  Regular     Activity: No restrictions     Condition at discharge: Good     Follow up with: OB     Follow up in: 1-2 weeks     Complications: None     Disposition: Home    [unfilled]

## 2024-08-12 NOTE — PLAN OF CARE
Goal Outcome Evaluation:           Progress: improving  Outcome Evaluation: Pt VS WNL. Tylenol given PRN x1. Pt able to sleep through night. NST completed see NST note.

## 2024-08-12 NOTE — PROGRESS NOTES
GEORGINA Gilbert  Obstetric Progress Note    Subjective     Patient:    The patient feels much better today;  no n/v, pain much improved     Objective     Vital Signs Range for the last 24 hours  Temp:  [97.7 °F (36.5 °C)-98.2 °F (36.8 °C)] 97.9 °F (36.6 °C)   Temp src: Oral   BP: (104-134)/(48-81) 112/74   Heart Rate:  [] 105   Resp:  [16-18] 18            Intake/Output Summary (Last 24 hours) at 8/12/2024 0910  Last data filed at 8/12/2024 0618  Gross per 24 hour   Intake 5425 ml   Output 3650 ml   Net 1775 ml       No intake/output data recorded.    Physical Exam:  General: Patient is well appearing         Cervix: Method: sterile exam per RN   Dilation: Cervical Dilation (cm): 0   Effacement: Cervical Effacement: 0%   Station:       Fetal Heart Rate Assessment   Method: Fetal HR Assessment Method: external   Beats/min: Fetal HR (beats/min): 130   Baseline: Fetal HR Baseline: normal range   Variability: Fetal HR Variability: moderate (amplitude range 6 to 25 bpm)   Accels: Fetal HR Accelerations: episodic, greater than/equal to 10 bpm (32 wks gest or less), lasts at least 10 seconds (32 wks gest or less)   Decels: Fetal HR Decelerations: absent     Uterine Assessment   Method: Method: palpation, external tocotransducer   Frequency (min): Contraction Frequency (Minutes): occasional   Ctx Count in 10 min:     Duration:     Intensity: Contraction Intensity: no contractions   Intensity by IUPC:     Resting Tone: Uterine Resting Tone: soft by palpation   Resting Tone by IUPC:     Yeso Units:         Assessment & Plan       Pyelonephritis    Assessment:  1.  Intrauterine pregnancy at 31w3d gestation with reactive fetal status.        Plan:  1.  Pt to get 3rd dose of abx and can b d/c as WBC wnl, afeb, urine cx with Gm neg bacillus;  will f/u on sensitivites and d/c home with macrobid therapeutic for 4 more days then preventative for the rest of the pregnancy  2. Plan of care has been reviewed with patient   3.   All questions have been answered.      Electronically signed by Indiana Thomason MD, 08/12/24, 9:10 AM EDT.

## 2024-08-12 NOTE — PLAN OF CARE
Goal Outcome Evaluation:  Plan of Care Reviewed With: patient        Progress: improving  Outcome Evaluation: patient vitals signs WNL, no c/o pain during shift, NST completed, discharge teaching completed, patient verbalized understanding

## 2024-08-12 NOTE — NON STRESS TEST
Obstetrical Non-stress Test Interpretation     Name:  Bekah Rubin  MRN: [id number]    24 y.o. female  at 31w3d    Indication: pyelonephritis      Fetal Assessment  Fetal Movement: active  Fetal HR Assessment Method: external  Fetal HR (beats/min): 130  Fetal HR Baseline: normal range  Fetal HR Variability: moderate (amplitude range 6 to 25 bpm)  Fetal HR Accelerations: episodic, greater than/equal to 10 bpm (32 wks gest or less), lasts at least 10 seconds (32 wks gest or less)  Fetal HR Decelerations: absent  Sinusoidal Pattern Present: absent    /62 (BP Location: Left arm, Patient Position: Lying)   Pulse 89   Temp 97.7 °F (36.5 °C) (Oral)   Resp 16   LMP 01/10/2024 (Approximate) Comment: 7 weeks    Reason for test: Other (Comment) (pyelonephritis)  Date of Test: 2024  Time frame of test:   RN NST Interpretation: Reactive      Darlyn Doherty RN  2024  09:00 EDT

## 2024-08-15 LAB — BACTERIA SPEC AEROBE CULT: NORMAL

## 2024-08-19 ENCOUNTER — HOSPITAL ENCOUNTER (OUTPATIENT)
Dept: LABOR AND DELIVERY | Facility: HOSPITAL | Age: 24
Discharge: HOME | End: 2024-08-19
Payer: COMMERCIAL

## 2024-08-19 ENCOUNTER — HOSPITAL ENCOUNTER (OUTPATIENT)
Facility: HOSPITAL | Age: 24
Discharge: HOME | End: 2024-08-19
Attending: OBSTETRICS & GYNECOLOGY | Admitting: OBSTETRICS & GYNECOLOGY
Payer: COMMERCIAL

## 2024-08-19 VITALS — TEMPERATURE: 98 F | RESPIRATION RATE: 18 BRPM

## 2024-08-19 PROCEDURE — 59025 FETAL NON-STRESS TEST: CPT

## 2024-08-19 NOTE — NON STRESS TEST
Obstetrical Non-stress Test Interpretation     Name:  Bekah Rubin  MRN: [id number]    24 y.o. female  at 32w3d    Indication: Gestational DM, Class A1      Fetal Assessment  Fetal Movement: active  Fetal HR Assessment Method: external  Fetal HR (beats/min): 135  Fetal HR Baseline: normal range  Fetal HR Variability: moderate (amplitude range 6 to 25 bpm)  Fetal HR Accelerations: episodic, greater than/equal to 15 bpm, lasting at least 15 seconds  Fetal HR Decelerations: absent  Sinusoidal Pattern Present: absent    Temp 98 °F (36.7 °C) (Oral)   Resp 18   LMP 01/10/2024 (Approximate) Comment: 7 weeks    Reason for test: Diabetes (Gestational, Class A1)  Date of Test: 2024  Time frame of test: 8243-1441  RN NST Interpretation: Cheryle Lopez RN  2024  13:30 EDT

## 2024-08-20 NOTE — NON STRESS TEST
Obstetrical Non-stress Test Interpretation     Name:  Bekah Rubin  MRN: [id number]    24 y.o. female  at 32w3d    Indication: GDM      Baseline: Normal 110-160 bpm  Variability:   Moderate/Normal (amplitude 6-25 bpm)  Accelerations: Present (32 weeks+) 15 x 15 bpm  Decelerations: Absent   Contractions:  Absent       Impression:  Category I       Odalis Lund MD  2024  22:07 EDT

## 2024-08-26 ENCOUNTER — HOSPITAL ENCOUNTER (OUTPATIENT)
Facility: HOSPITAL | Age: 24
Discharge: HOME | End: 2024-08-26
Attending: OBSTETRICS & GYNECOLOGY | Admitting: OBSTETRICS & GYNECOLOGY
Payer: COMMERCIAL

## 2024-08-26 VITALS — HEART RATE: 108 BPM | RESPIRATION RATE: 16 BRPM | DIASTOLIC BLOOD PRESSURE: 74 MMHG | SYSTOLIC BLOOD PRESSURE: 115 MMHG

## 2024-08-26 PROCEDURE — 59025 FETAL NON-STRESS TEST: CPT

## 2024-08-26 NOTE — NON STRESS TEST
Obstetrical Non-stress Test Interpretation     Name:  Bekah Rubin  MRN: [id number]    24 y.o. female  at 33w3d    Indication: Gestational DM, Class A1      Fetal Assessment  Fetal Movement: active  Fetal HR Assessment Method: external  Fetal HR (beats/min): 125  Fetal HR Baseline: normal range  Fetal HR Variability: moderate (amplitude range 6 to 25 bpm)  Fetal HR Accelerations: episodic, greater than/equal to 15 bpm, lasting at least 15 seconds  Fetal HR Decelerations: absent  Sinusoidal Pattern Present: absent    /74 (BP Location: Right arm, Patient Position: Sitting)   Pulse 108   Resp 16   LMP 01/10/2024 (Approximate) Comment: 7 weeks    Reason for test: Diabetes (gestational, class A1)  Date of Test: 2024  Time frame of test: 0444-7930  RN NST Interpretation: Reactive      Jody Lopez RN  2024  13:12 EDT

## 2024-09-02 ENCOUNTER — HOSPITAL ENCOUNTER (OUTPATIENT)
Dept: LABOR AND DELIVERY | Facility: HOSPITAL | Age: 24
Discharge: HOME | End: 2024-09-02
Payer: COMMERCIAL

## 2024-09-02 ENCOUNTER — HOSPITAL ENCOUNTER (OUTPATIENT)
Facility: HOSPITAL | Age: 24
Discharge: HOME | End: 2024-09-02
Attending: STUDENT IN AN ORGANIZED HEALTH CARE EDUCATION/TRAINING PROGRAM | Admitting: STUDENT IN AN ORGANIZED HEALTH CARE EDUCATION/TRAINING PROGRAM
Payer: COMMERCIAL

## 2024-09-02 VITALS — DIASTOLIC BLOOD PRESSURE: 68 MMHG | HEART RATE: 102 BPM | SYSTOLIC BLOOD PRESSURE: 105 MMHG | RESPIRATION RATE: 18 BRPM

## 2024-09-02 PROCEDURE — 59025 FETAL NON-STRESS TEST: CPT

## 2024-09-02 PROCEDURE — G0463 HOSPITAL OUTPT CLINIC VISIT: HCPCS

## 2024-09-02 NOTE — NON STRESS TEST
Obstetrical Non-stress Test Interpretation     Name:  Bekah Rubin  MRN: [id number]    24 y.o. female  at 34w3d    Indication: GDMA2      Baseline: Normal 110-160 bpm  Variability:   Moderate/Normal (amplitude 6-25 bpm)  Accelerations: Present (32 weeks+) 15 x 15 bpm  Decelerations: Absent   Contractions:  Absent       Impression:  Category I       Odalis Lund MD  2024  16:25 EDT

## 2024-09-02 NOTE — NON STRESS TEST
Obstetrical Non-stress Test Interpretation     Name:  Bekah Rubin  MRN: [id number]    24 y.o. female  at 34w3d    Indication: NST/GDM      Fetal Assessment  Fetal Movement: active  Fetal HR Assessment Method: external  Fetal HR (beats/min): 125  Fetal HR Baseline: normal range  Fetal HR Variability: moderate (amplitude range 6 to 25 bpm)  Fetal HR Accelerations: lasting at least 15 seconds  Fetal HR Decelerations: absent    /68 (BP Location: Right arm, Patient Position: Lying)   Pulse 102   Resp 18   LMP 01/10/2024 (Approximate) Comment: 7 weeks    Reason for test: OB Triage (NST/GDM)  Date of Test: 2024  Time frame of test:8600-5408  RN NST Interpretation: Reactive      Hamida Osman RN  2024  13:34 EDT

## 2024-09-09 ENCOUNTER — HOSPITAL ENCOUNTER (OUTPATIENT)
Facility: HOSPITAL | Age: 24
Discharge: HOME | End: 2024-09-09
Attending: OBSTETRICS & GYNECOLOGY | Admitting: OBSTETRICS & GYNECOLOGY
Payer: COMMERCIAL

## 2024-09-09 ENCOUNTER — HOSPITAL ENCOUNTER (OUTPATIENT)
Dept: LABOR AND DELIVERY | Facility: HOSPITAL | Age: 24
Discharge: HOME | End: 2024-09-09
Payer: COMMERCIAL

## 2024-09-09 VITALS
SYSTOLIC BLOOD PRESSURE: 112 MMHG | TEMPERATURE: 98.1 F | DIASTOLIC BLOOD PRESSURE: 73 MMHG | RESPIRATION RATE: 18 BRPM | HEART RATE: 105 BPM

## 2024-09-09 PROBLEM — O24.410 DIET CONTROLLED GESTATIONAL DIABETES MELLITUS (GDM) IN THIRD TRIMESTER: Status: ACTIVE | Noted: 2024-09-09

## 2024-09-09 PROCEDURE — 59025 FETAL NON-STRESS TEST: CPT

## 2024-09-09 NOTE — NON STRESS TEST
Obstetrical Non-stress Test Interpretation     Name:  Bekah Rubin  MRN: [id number]    24 y.o. female  at 35w3d    Indication: GDM      Fetal Assessment  Fetal Movement: active  Fetal HR Assessment Method: external  Fetal HR (beats/min): 135  Fetal HR Baseline: normal range  Fetal HR Variability: moderate (amplitude range 6 to 25 bpm)  Fetal HR Accelerations: greater than/equal to 15 bpm, lasting at least 15 seconds  Fetal HR Decelerations: absent  Sinusoidal Pattern Present: absent    /73   Pulse 105   Temp 98.1 °F (36.7 °C) (Oral)   Resp 18   LMP 01/10/2024 (Approximate) Comment: 7 weeks    Reason for test: Diabetes  Date of Test: 2024  Time frame of test: 3344-7134  RN NST Interpretation: Reactive      Nissa Rae RN  2024  13:33 EDT

## 2024-09-09 NOTE — NON STRESS TEST
OB SCHEDULED NST NOTE        Name:  Bekah Rubin  MRN: [id number]    24 y.o. female  at 35w3d    Indication: GDM    NST:  Baseline: Normal 110-160 bpm  Variability:   Moderate/Normal (amplitude 6-25 bpm)  Accelerations: Present (32 weeks+) 15 x 15 bpm  Decelerations: None  Contractions:  Rare (1 seen)  Duration: see nursing documentation    Impression:  Category I     Plan: Keep scheduled NSTs, Keep office visit      Electronically signed by Laurie Schulz MD,  24,   1:36 PM EDT.    [facility]  [address] [place] [id number]  Dept: [phone]  Dept Fax: [phone]  Loc: [phone]      No

## 2024-09-20 ENCOUNTER — HOSPITAL ENCOUNTER (INPATIENT)
Facility: HOSPITAL | Age: 24
LOS: 3 days | Discharge: HOME | End: 2024-09-23
Attending: OBSTETRICS & GYNECOLOGY | Admitting: OBSTETRICS & GYNECOLOGY
Payer: COMMERCIAL

## 2024-09-20 ENCOUNTER — ANESTHESIA EVENT (OUTPATIENT)
Dept: LABOR AND DELIVERY | Facility: HOSPITAL | Age: 24
End: 2024-09-20
Payer: COMMERCIAL

## 2024-09-20 ENCOUNTER — ANESTHESIA (OUTPATIENT)
Dept: LABOR AND DELIVERY | Facility: HOSPITAL | Age: 24
End: 2024-09-20
Payer: COMMERCIAL

## 2024-09-20 ENCOUNTER — HOSPITAL ENCOUNTER (OUTPATIENT)
Dept: LABOR AND DELIVERY | Facility: HOSPITAL | Age: 24
Discharge: HOME | End: 2024-09-20
Payer: COMMERCIAL

## 2024-09-20 LAB
ABO GROUP BLD: NORMAL
ALBUMIN SERPL-MCNC: 3.5 G/DL (ref 3.5–5.2)
ALBUMIN/GLOB SERPL: 1 G/DL
ALP SERPL-CCNC: 256 U/L (ref 39–117)
ALT SERPL W P-5'-P-CCNC: 51 U/L (ref 1–33)
AMPHET+METHAMPHET UR QL: NEGATIVE
ANION GAP SERPL CALCULATED.3IONS-SCNC: 13.2 MMOL/L (ref 5–15)
AST SERPL-CCNC: 36 U/L (ref 1–32)
BARBITURATES UR QL SCN: NEGATIVE
BENZODIAZ UR QL SCN: NEGATIVE
BILIRUB SERPL-MCNC: 0.2 MG/DL (ref 0–1.2)
BLD GP AB SCN SERPL QL: NEGATIVE
BUN SERPL-MCNC: 8 MG/DL (ref 6–20)
BUN/CREAT SERPL: 17.4 (ref 7–25)
CALCIUM SPEC-SCNC: 9.1 MG/DL (ref 8.6–10.5)
CANNABINOIDS SERPL QL: NEGATIVE
CHLORIDE SERPL-SCNC: 104 MMOL/L (ref 98–107)
CO2 SERPL-SCNC: 19.8 MMOL/L (ref 22–29)
COCAINE UR QL: NEGATIVE
CREAT SERPL-MCNC: 0.46 MG/DL (ref 0.57–1)
DEPRECATED RDW RBC AUTO: 44.4 FL (ref 37–54)
EGFRCR SERPLBLD CKD-EPI 2021: 138.1 ML/MIN/1.73
ERYTHROCYTE [DISTWIDTH] IN BLOOD BY AUTOMATED COUNT: 13.8 % (ref 12.3–15.4)
FENTANYL UR-MCNC: NEGATIVE NG/ML
GLOBULIN UR ELPH-MCNC: 3.4 GM/DL
GLUCOSE BLDC GLUCOMTR-MCNC: 75 MG/DL (ref 70–99)
GLUCOSE BLDC GLUCOMTR-MCNC: 78 MG/DL (ref 70–99)
GLUCOSE BLDC GLUCOMTR-MCNC: 80 MG/DL (ref 70–99)
GLUCOSE SERPL-MCNC: 80 MG/DL (ref 65–99)
HCT VFR BLD AUTO: 35.2 % (ref 34–46.6)
HGB BLD-MCNC: 12 G/DL (ref 12–15.9)
MCH RBC QN AUTO: 30.1 PG (ref 26.6–33)
MCHC RBC AUTO-ENTMCNC: 34.1 G/DL (ref 31.5–35.7)
MCV RBC AUTO: 88.2 FL (ref 79–97)
METHADONE UR QL SCN: NEGATIVE
OPIATES UR QL: NEGATIVE
OXYCODONE UR QL SCN: NEGATIVE
PLATELET # BLD AUTO: 146 10*3/MM3 (ref 140–450)
PMV BLD AUTO: 11.6 FL (ref 6–12)
POTASSIUM SERPL-SCNC: 3.6 MMOL/L (ref 3.5–5.2)
PROT SERPL-MCNC: 6.9 G/DL (ref 6–8.5)
RBC # BLD AUTO: 3.99 10*6/MM3 (ref 3.77–5.28)
RH BLD: POSITIVE
SODIUM SERPL-SCNC: 137 MMOL/L (ref 136–145)
T&S EXPIRATION DATE: NORMAL
TREPONEMA PALLIDUM IGG+IGM AB [PRESENCE] IN SERUM OR PLASMA BY IMMUNOASSAY: NORMAL
WBC NRBC COR # BLD AUTO: 11.49 10*3/MM3 (ref 3.4–10.8)

## 2024-09-20 PROCEDURE — 86901 BLOOD TYPING SEROLOGIC RH(D): CPT | Performed by: OBSTETRICS & GYNECOLOGY

## 2024-09-20 PROCEDURE — 80307 DRUG TEST PRSMV CHEM ANLYZR: CPT | Performed by: OBSTETRICS & GYNECOLOGY

## 2024-09-20 PROCEDURE — 25010000002 FENTANYL CITRATE (PF) 50 MCG/ML SOLUTION: Performed by: NURSE ANESTHETIST, CERTIFIED REGISTERED

## 2024-09-20 PROCEDURE — 25010000002 BUPIVACAINE (PF) 0.25 % SOLUTION: Performed by: NURSE ANESTHETIST, CERTIFIED REGISTERED

## 2024-09-20 PROCEDURE — 82948 REAGENT STRIP/BLOOD GLUCOSE: CPT

## 2024-09-20 PROCEDURE — 3E033VJ INTRODUCTION OF OTHER HORMONE INTO PERIPHERAL VEIN, PERCUTANEOUS APPROACH: ICD-10-PCS | Performed by: OBSTETRICS & GYNECOLOGY

## 2024-09-20 PROCEDURE — 86850 RBC ANTIBODY SCREEN: CPT | Performed by: OBSTETRICS & GYNECOLOGY

## 2024-09-20 PROCEDURE — 85027 COMPLETE CBC AUTOMATED: CPT | Performed by: OBSTETRICS & GYNECOLOGY

## 2024-09-20 PROCEDURE — 80053 COMPREHEN METABOLIC PANEL: CPT | Performed by: OBSTETRICS & GYNECOLOGY

## 2024-09-20 PROCEDURE — 86900 BLOOD TYPING SEROLOGIC ABO: CPT | Performed by: OBSTETRICS & GYNECOLOGY

## 2024-09-20 PROCEDURE — 86780 TREPONEMA PALLIDUM: CPT | Performed by: OBSTETRICS & GYNECOLOGY

## 2024-09-20 PROCEDURE — 25010000002 BUTORPHANOL PER 1 MG: Performed by: OBSTETRICS & GYNECOLOGY

## 2024-09-20 PROCEDURE — 25010000002 ONDANSETRON PER 1 MG: Performed by: OBSTETRICS & GYNECOLOGY

## 2024-09-20 PROCEDURE — 25810000003 LACTATED RINGERS SOLUTION: Performed by: OBSTETRICS & GYNECOLOGY

## 2024-09-20 PROCEDURE — C1755 CATHETER, INTRASPINAL: HCPCS | Performed by: NURSE ANESTHETIST, CERTIFIED REGISTERED

## 2024-09-20 PROCEDURE — 25810000003 LACTATED RINGERS PER 1000 ML: Performed by: OBSTETRICS & GYNECOLOGY

## 2024-09-20 RX ORDER — ACETAMINOPHEN 325 MG/1
650 TABLET ORAL EVERY 4 HOURS PRN
Status: DISCONTINUED | OUTPATIENT
Start: 2024-09-20 | End: 2024-09-21 | Stop reason: HOSPADM

## 2024-09-20 RX ORDER — BUPIVACAINE HYDROCHLORIDE 2.5 MG/ML
INJECTION, SOLUTION EPIDURAL; INFILTRATION; INTRACAUDAL AS NEEDED
Status: DISCONTINUED | OUTPATIENT
Start: 2024-09-20 | End: 2024-09-21 | Stop reason: SURG

## 2024-09-20 RX ORDER — FENTANYL CITRATE 50 UG/ML
INJECTION, SOLUTION INTRAMUSCULAR; INTRAVENOUS
Status: COMPLETED | OUTPATIENT
Start: 2024-09-20 | End: 2024-09-20

## 2024-09-20 RX ORDER — CITRIC ACID/SODIUM CITRATE 334-500MG
30 SOLUTION, ORAL ORAL ONCE AS NEEDED
Status: DISCONTINUED | OUTPATIENT
Start: 2024-09-20 | End: 2024-09-21 | Stop reason: HOSPADM

## 2024-09-20 RX ORDER — ONDANSETRON 4 MG/1
4 TABLET, ORALLY DISINTEGRATING ORAL EVERY 6 HOURS PRN
Status: DISCONTINUED | OUTPATIENT
Start: 2024-09-20 | End: 2024-09-21 | Stop reason: HOSPADM

## 2024-09-20 RX ORDER — FENTANYL CITRATE 50 UG/ML
INJECTION, SOLUTION INTRAMUSCULAR; INTRAVENOUS
Status: COMPLETED
Start: 2024-09-20 | End: 2024-09-20

## 2024-09-20 RX ORDER — SODIUM CHLORIDE 9 MG/ML
40 INJECTION, SOLUTION INTRAVENOUS AS NEEDED
Status: DISCONTINUED | OUTPATIENT
Start: 2024-09-20 | End: 2024-09-21 | Stop reason: HOSPADM

## 2024-09-20 RX ORDER — EPHEDRINE SULFATE 50 MG/ML
5 INJECTION, SOLUTION INTRAVENOUS
Status: DISCONTINUED | OUTPATIENT
Start: 2024-09-20 | End: 2024-09-21 | Stop reason: HOSPADM

## 2024-09-20 RX ORDER — BUPIVACAINE HYDROCHLORIDE 2.5 MG/ML
INJECTION, SOLUTION EPIDURAL; INFILTRATION; INTRACAUDAL
Status: COMPLETED
Start: 2024-09-20 | End: 2024-09-20

## 2024-09-20 RX ORDER — SODIUM CHLORIDE 0.9 % (FLUSH) 0.9 %
10 SYRINGE (ML) INJECTION AS NEEDED
Status: DISCONTINUED | OUTPATIENT
Start: 2024-09-20 | End: 2024-09-21 | Stop reason: HOSPADM

## 2024-09-20 RX ORDER — MISOPROSTOL 100 MCG
25 TABLET ORAL
Status: DISPENSED | OUTPATIENT
Start: 2024-09-20 | End: 2024-09-20

## 2024-09-20 RX ORDER — LIDOCAINE HYDROCHLORIDE AND EPINEPHRINE 15; 5 MG/ML; UG/ML
INJECTION, SOLUTION EPIDURAL
Status: COMPLETED | OUTPATIENT
Start: 2024-09-20 | End: 2024-09-20

## 2024-09-20 RX ORDER — OXYTOCIN/0.9 % SODIUM CHLORIDE 30/500 ML
2-20 PLASTIC BAG, INJECTION (ML) INTRAVENOUS
Status: DISCONTINUED | OUTPATIENT
Start: 2024-09-20 | End: 2024-09-21 | Stop reason: HOSPADM

## 2024-09-20 RX ORDER — SODIUM CHLORIDE, SODIUM LACTATE, POTASSIUM CHLORIDE, CALCIUM CHLORIDE 600; 310; 30; 20 MG/100ML; MG/100ML; MG/100ML; MG/100ML
125 INJECTION, SOLUTION INTRAVENOUS CONTINUOUS
Status: DISCONTINUED | OUTPATIENT
Start: 2024-09-20 | End: 2024-09-21

## 2024-09-20 RX ORDER — FAMOTIDINE 20 MG/1
20 TABLET, FILM COATED ORAL EVERY 12 HOURS SCHEDULED
Status: DISCONTINUED | OUTPATIENT
Start: 2024-09-20 | End: 2024-09-21 | Stop reason: HOSPADM

## 2024-09-20 RX ORDER — LIDOCAINE HYDROCHLORIDE 10 MG/ML
0.5 INJECTION, SOLUTION EPIDURAL; INFILTRATION; INTRACAUDAL; PERINEURAL ONCE AS NEEDED
Status: DISCONTINUED | OUTPATIENT
Start: 2024-09-20 | End: 2024-09-21 | Stop reason: HOSPADM

## 2024-09-20 RX ORDER — ONDANSETRON 2 MG/ML
4 INJECTION INTRAMUSCULAR; INTRAVENOUS EVERY 6 HOURS PRN
Status: DISCONTINUED | OUTPATIENT
Start: 2024-09-20 | End: 2024-09-21 | Stop reason: HOSPADM

## 2024-09-20 RX ORDER — FAMOTIDINE 10 MG/ML
20 INJECTION, SOLUTION INTRAVENOUS EVERY 12 HOURS SCHEDULED
Status: DISCONTINUED | OUTPATIENT
Start: 2024-09-20 | End: 2024-09-21 | Stop reason: HOSPADM

## 2024-09-20 RX ORDER — SODIUM CHLORIDE 0.9 % (FLUSH) 0.9 %
10 SYRINGE (ML) INJECTION EVERY 12 HOURS SCHEDULED
Status: DISCONTINUED | OUTPATIENT
Start: 2024-09-20 | End: 2024-09-21 | Stop reason: HOSPADM

## 2024-09-20 RX ORDER — FENTANYL/ROPIVACAINE/NS/PF 2MCG/ML-.2
PLASTIC BAG, INJECTION (ML) INJECTION
Status: COMPLETED
Start: 2024-09-20 | End: 2024-09-20

## 2024-09-20 RX ADMIN — BUTORPHANOL TARTRATE 2 MG: 2 INJECTION, SOLUTION INTRAMUSCULAR; INTRAVENOUS at 09:11

## 2024-09-20 RX ADMIN — Medication 10 ML/HR: at 21:52

## 2024-09-20 RX ADMIN — Medication 2 MILLI-UNITS/MIN: at 10:10

## 2024-09-20 RX ADMIN — SODIUM CHLORIDE, POTASSIUM CHLORIDE, SODIUM LACTATE AND CALCIUM CHLORIDE 500 ML: 600; 310; 30; 20 INJECTION, SOLUTION INTRAVENOUS at 06:24

## 2024-09-20 RX ADMIN — LIDOCAINE HYDROCHLORIDE AND EPINEPHRINE 3 ML: 15; 5 INJECTION, SOLUTION EPIDURAL at 14:42

## 2024-09-20 RX ADMIN — SODIUM CHLORIDE, POTASSIUM CHLORIDE, SODIUM LACTATE AND CALCIUM CHLORIDE 125 ML/HR: 600; 310; 30; 20 INJECTION, SOLUTION INTRAVENOUS at 01:13

## 2024-09-20 RX ADMIN — Medication 10 ML: at 01:43

## 2024-09-20 RX ADMIN — ONDANSETRON 4 MG: 2 INJECTION INTRAMUSCULAR; INTRAVENOUS at 21:28

## 2024-09-20 RX ADMIN — SODIUM CHLORIDE, POTASSIUM CHLORIDE, SODIUM LACTATE AND CALCIUM CHLORIDE 125 ML/HR: 600; 310; 30; 20 INJECTION, SOLUTION INTRAVENOUS at 10:10

## 2024-09-20 RX ADMIN — Medication 25 MCG: at 01:39

## 2024-09-20 RX ADMIN — LIDOCAINE HYDROCHLORIDE AND EPINEPHRINE 2 ML: 15; 5 INJECTION, SOLUTION EPIDURAL at 14:45

## 2024-09-20 RX ADMIN — FENTANYL CITRATE 100 MCG: 50 INJECTION, SOLUTION INTRAMUSCULAR; INTRAVENOUS at 14:45

## 2024-09-20 RX ADMIN — Medication 10 ML/HR: at 14:47

## 2024-09-20 RX ADMIN — BUPIVACAINE HYDROCHLORIDE 3 ML: 2.5 INJECTION, SOLUTION EPIDURAL; INFILTRATION; INTRACAUDAL; PERINEURAL at 14:45

## 2024-09-20 NOTE — H&P
[facility]  Obstetric History and Physical    Chief Complaint   Patient presents with    Scheduled Induction       Subjective     Patient is a 24 y.o. female  currently at 37w0d, who presents with GDM, A2.    Her prenatal care is complicated by  sexually transmitted disease  genital herpes  no current active lesion or prodromal symptoms are preseent, diabetes  GDM A2, and abnormal fetal growth  macrosomia.  Her previous obstetric/gynecological history is noted for is non-contributory.    The following portions of the patients history were reviewed and updated as appropriate: current medications, allergies, past medical history, past surgical history, past family history, past social history, and problem list .        Tag     Copy  New Onbase, Eastern  Physician     H&P     Signed     Date of Service: 24 0000  Creation Time: 24   Related encounter: LABOR INDUCTION from 2024 in [facility] LABOR AND DELIVERY PROCEDURES     Associated Documents  Scan on 8/10/2024 1531 by New Onbase, Eastern: RAOUL BEY BILLY 24         Last signed by: New Onbase, Eastern at 24     Prenatal Information:  Prenatal Results       Initial Prenatal Labs       Test Value Reference Range Date Time    Hemoglobin  15.2 g/dL 12.0 - 15.9 24    Hematocrit  45.2 % 34.0 - 46.6 24    Platelets  276 10*3/mm3 140 - 450 24    Rubella IgG        Hepatitis B SAg        Hepatitis C Ab        RPR        T. Pallidum Ab         ABO  O   23    Rh  Positive   23    Antibody Screen        HIV        Urine Culture  >100,000 CFU/mL Klebsiella pneumoniae ssp pneumoniae   08/10/24 0902       <25,000 CFU/mL Mixed Brandy Isolated   24 1855    Gonorrhea        Chlamydia        TSH        HgB A1c         Varicella IgG        Hemoglobinopathy Fractionation        Hemoglobinopathy (genetic testing)        Cystic fibrosis                   Fetal testing         Test Value Reference Range Date Time    NIPT        MSAFP        AFP-4                  2nd and 3rd Trimester       Test Value Reference Range Date Time    Hemoglobin (repeated)  10.7 g/dL 12.0 - 15.9 08/11/24 1005       11.7 g/dL 12.0 - 15.9 08/10/24 0901    Hematocrit (repeated)  32.2 % 34.0 - 46.6 08/11/24 1005       34.3 % 34.0 - 46.6 08/10/24 0901    Platelets   142 10*3/mm3 140 - 450 08/11/24 1005       150 10*3/mm3 140 - 450 08/10/24 0901       276 10*3/mm3 140 - 450 02/17/24 2306    1 hour GTT         Antibody Screen (repeated)        3rd TM syphilis scrn (repeated)  RPR         3rd TM syphilis scrn (repeated) TP-Ab        3rd TM syphilis screen TB-Ab (FTA)        Syphilis cascade test TP-Ab (EIA)        Syphilis cascade TPPA        GTT Fasting        GTT 1 Hr        GTT 2 Hr        GTT 3 Hr        Group B Strep                  Other testing        Test Value Reference Range Date Time    [place] IgG         CMV IgG                   Drug Screening       Test Value Reference Range Date Time    Amphetamine Screen        Barbiturate Screen        Benzodiazepine Screen        Methadone Screen        Phencyclidine Screen        Opiates Screen        THC Screen        Cocaine Screen        Propoxyphene Screen        Buprenorphine Screen        Methamphetamine Screen        Oxycodone Screen        Tricyclic Antidepressants Screen                  Legend    ^: Historical                          External Prenatal Results       Pregnancy Outside Results - Transcribed From Office Records - See Scanned Records For Details       Test Value Date Time    ABO  O  03/28/23 2304    Rh  Positive  03/28/23 2304    Antibody Screen       Varicella IgG       Rubella       Hgb  10.7 g/dL 08/11/24 1005       11.7 g/dL 08/10/24 0901       15.2 g/dL 02/17/24 2306    Hct  32.2 % 08/11/24 1005       34.3 % 08/10/24 0901       45.2 % 02/17/24 2306    HgB A1c        1h GTT       3h GTT Fasting       3h GTT 1 hour       3h GTT 2 hour        3h GTT 3 hour        Gonorrhea (discrete)       Chlamydia (discrete)       RPR       Syphils cascade: TP-Ab (FTA)       TP-Ab       TP-Ab (EIA)       TPPA       HBsAg       Herpes Simplex Virus PCR       Herpes Simplex VIrus Culture       HIV       Hep C RNA Quant PCR       Hep C Antibody       AFP       NIPT       Cystic Fibroisis        Group B Strep       GBS Susceptibility to Clindamycin       GBS Susceptibility to Erythromycin       Fetal Fibronectin       Genetic Testing, Maternal Blood                 Drug Screening       Test Value Date Time    Urine Drug Screen       Amphetamine Screen       Barbiturate Screen       Benzodiazepine Screen       Methadone Screen       Phencyclidine Screen       Opiates Screen       THC Screen       Cocaine Screen       Propoxyphene Screen       Buprenorphine Screen       Methamphetamine Screen       Oxycodone Screen       Tricyclic Antidepressants Screen                 Legend    ^: Historical                             Past OB History:     OB History    Para Term  AB Living   2 0 0 0 1 0   SAB IAB Ectopic Molar Multiple Live Births   1 0 0 0 0 0      # Outcome Date GA Lbr Kevin/2nd Weight Sex Type Anes PTL Lv   2 Current            1 SAB                Past Medical History: Past Medical History:   Diagnosis Date    Hepatitis C     Herpes     Herpes     Hyperlipidemia       Past Surgical History History reviewed. No pertinent surgical history.   Family History: History reviewed. No pertinent family history.   Social History:  reports that she has quit smoking. Her smoking use included cigarettes. She has been exposed to tobacco smoke. She has never used smokeless tobacco.   reports no history of alcohol use.   reports that she does not currently use drugs after having used the following drugs: Methamphetamines and IV.        General ROS: Pertinent items are noted in HPI    Objective       Vital Signs Range for the last 24 hours  Temperature:     Temp Source:      BP:     Pulse:     Respirations:     SPO2:     O2 Amount (l/min):     O2 Devices     Weight:       Physical Examination: General appearance - alert, well appearing, and in no distress  Mental status - alert, oriented to person, place, and time  Eyes - sclera anicteric  Mouth - mucous membranes moist, pharynx normal without lesions  Neck - supple, no significant adenopathy  Chest - clear to auscultation, no wheezes, rales or rhonchi, symmetric air entry  Heart - normal rate, regular rhythm, normal S1, S2, no murmurs, rubs, clicks or gallops  Abdomen - gravid; non tender  Pelvic - normal external genitalia, vulva, vagina, cervix, uterus and adnexa  Back exam - full range of motion, no tenderness, palpable spasm or pain on motion  Neurological - alert, oriented, normal speech, no focal findings or movement disorder noted  Extremities - peripheral pulses normal, no pedal edema, no clubbing or cyanosis    Presentation: vertex   Cervix: Exam by:     Dilation:     Effacement:     Station:         Fetal Heart Rate Assessment   Method:     Beats/min:     Baseline:     Variability:     Accels:     Decels:     Tracing Category:       Uterine Assessment   Method:     Frequency (min):     Ctx Count in 10 min:     Duration:     Intensity:     Intensity by IUPC:     Resting Tone:     Resting Tone by IUPC:     [place] Units:       Laboratory Results: reviewed in Epic  Radiology Review: reviewed in PNR  Other Studies: PNR    Assessment & Plan       * No active hospital problems. *        Assessment:  1.  Intrauterine pregnancy at 37w0d gestation with reactive fetal status.    2.  induction of labor  for GDMA2  Premedicated.  Cook Catheter placed for cervical ripening, 80 ml uterine; 20 ml saline, vaginal. (Stylus and speculum used to facilitate placement.  Both removed.)  Tolerated well  Assistant present  3.  Obstetrical history significant for is non-contributory.  4.  GBS status: No results found for:  "\"STREPGPB\"    Plan:  1. Vaginal anticipated  2. Plan of care has been reviewed with patient,  nurse, and support person's at bedside  3.  Risks, benefits of treatment plan have been discussed.  4.  All questions have been answered.  5.  Pitocin as indicated      Odalis Lund MD  9/20/2024  00:21 EDT    "

## 2024-09-20 NOTE — NURSING NOTE
Pt requested this RN to check on status of cook balloon, gentle traction applied no descent noted. I placed a sterile glove on and explored vagina to see if cook balloon was trapped and it was noted to still be in the uterus, however the vaginal balloon was completely deflated. Dr Lund notified and orders to reinflate vaginal balloon with 20ml.

## 2024-09-20 NOTE — PROGRESS NOTES
" [facility]  Obstetric Progress Note    Subjective     Patient:    The patient feels comfortable with epidural.      Objective     Vital Signs Range for the last 24 hours  Temp:  [97.8 °F (36.6 °C)-98.6 °F (37 °C)] 98.2 °F (36.8 °C)   Temp src: Oral   BP: ()/(49-89) 108/65   Heart Rate:  [] 84       SpO2:  [99 %-100 %] 100 %           Weight:  [93.9 kg (207 lb)] 93.9 kg (207 lb)       Flowsheet Rows      Flowsheet Row First Filed Value   Admission Height 157.5 cm (62\") Documented at 09/20/2024 0026   Admission Weight 93.9 kg (207 lb) Documented at 09/20/2024 0026            Intake/Output last 24 hours:    No intake or output data in the 24 hours ending 09/20/24 1752    Intake/Output this shift:    No intake/output data recorded.    Physical Exam:  General: Patient is in no acute distress                     Presentation: Vertex   Cervix: Exam by: Method: sterile exam per physician   Dilation: Cervical Dilation (cm): 1   Effacement: Cervical Effacement: 0%   Station: Fetal Station: 0-->-3         Fetal Heart Rate Assessment   Method: Fetal HR Assessment Method: external   Beats/min: Fetal HR (beats/min): 125   Baseline: Fetal HR Baseline: normal range   Variability: Fetal HR Variability: moderate (amplitude range 6 to 25 bpm)   Accels: Fetal HR Accelerations: episodic, greater than/equal to 15 bpm, lasting at least 15 seconds   Decels: Fetal HR Decelerations: absent   Tracing Category:       Uterine Assessment   Method: Method: external tocotransducer   Frequency (min): Contraction Frequency (Minutes): 1-3   Ctx Count in 10 min:     Duration:     Intensity: Contraction Intensity: moderate by palpation   Intensity by IUPC:     Resting Tone: Uterine Resting Tone: soft by palpation   Resting Tone by IUPC:     [place] Units:         Assessment & Plan       * No active hospital problems. *        Assessment:  1.  Intrauterine pregnancy at 37w0d gestation with reactive fetal status.    2.  induction of labor  " for GDM A2  Cook catheter remains in place  3.  Obstetrical history significant for non contributory.  4.  GBS status: Negative    Plan:  1. Vaginal anticipated  2. Plan of care has been reviewed with patient and support person at bedside  3.  Risks, benefits of treatment plan have been discussed.  4.  All questions have been answered.        Odalis Lund MD  9/20/2024  17:52 EDT

## 2024-09-21 PROBLEM — B19.20 HEPATITIS C: Status: ACTIVE | Noted: 2022-01-01

## 2024-09-21 PROBLEM — O24.410 DIET CONTROLLED GESTATIONAL DIABETES MELLITUS (GDM) IN THIRD TRIMESTER: Status: RESOLVED | Noted: 2024-09-09 | Resolved: 2024-09-21

## 2024-09-21 PROBLEM — K64.4 EXTERNAL HEMORRHOIDS: Status: ACTIVE | Noted: 2024-09-21

## 2024-09-21 PROBLEM — F19.11 OTHER PSYCHOACTIVE SUBSTANCE ABUSE, IN REMISSION: Status: ACTIVE | Noted: 2024-09-21

## 2024-09-21 PROBLEM — F41.9 ANXIETY: Status: ACTIVE | Noted: 2024-09-21

## 2024-09-21 PROBLEM — E66.812 CLASS 2 OBESITY WITH BODY MASS INDEX (BMI) OF 37.0 TO 37.9 IN ADULT: Status: ACTIVE | Noted: 2024-09-21

## 2024-09-21 PROBLEM — O24.414 INSULIN CONTROLLED WHITE CLASSIFICATION A2 GESTATIONAL DIABETES MELLITUS (GDM): Status: ACTIVE | Noted: 2024-09-21

## 2024-09-21 PROBLEM — E66.9 CLASS 2 OBESITY WITH BODY MASS INDEX (BMI) OF 37.0 TO 37.9 IN ADULT: Status: ACTIVE | Noted: 2024-09-21

## 2024-09-21 PROBLEM — N12 PYELONEPHRITIS: Status: RESOLVED | Noted: 2024-08-10 | Resolved: 2024-09-21

## 2024-09-21 LAB
ATMOSPHERIC PRESS: 737.5 MMHG
ATMOSPHERIC PRESS: 742.2 MMHG
BASE EXCESS BLDCOA CALC-SCNC: -8.6 MMOL/L (ref -2–2)
BASE EXCESS BLDCOV CALC-SCNC: -5.6 MMOL/L (ref -30–30)
GLUCOSE BLDC GLUCOMTR-MCNC: 61 MG/DL (ref 70–99)
GLUCOSE BLDC GLUCOMTR-MCNC: 79 MG/DL (ref 70–99)
HCO3 BLDCOA-SCNC: 20 MMOL/L
HCO3 BLDCOV-SCNC: 18.9 MMOL/L
PCO2 BLDCOA: 51 MMHG (ref 33–49)
PCO2 BLDCOV: 33.9 MM HG (ref 35–51.3)
PH BLDCOA: 7.2 PH UNITS (ref 7.18–7.34)
PH BLDCOV: 7.35 PH UNITS (ref 7.26–7.4)
PO2 BLDCOA: 24.3 MMHG
PO2 BLDCOV: 29.2 MM HG (ref 19–39)
SAO2 % BLDCOA: 31.4 %
SAO2 % BLDCOV: 53.3 %

## 2024-09-21 PROCEDURE — 25810000003 LACTATED RINGERS PER 1000 ML: Performed by: OBSTETRICS & GYNECOLOGY

## 2024-09-21 PROCEDURE — 51702 INSERT TEMP BLADDER CATH: CPT

## 2024-09-21 PROCEDURE — 25010000002 BUPIVACAINE (PF) 0.25 % SOLUTION: Performed by: NURSE ANESTHETIST, CERTIFIED REGISTERED

## 2024-09-21 PROCEDURE — 25010000002 ONDANSETRON PER 1 MG: Performed by: OBSTETRICS & GYNECOLOGY

## 2024-09-21 PROCEDURE — 82948 REAGENT STRIP/BLOOD GLUCOSE: CPT

## 2024-09-21 PROCEDURE — 10907ZC DRAINAGE OF AMNIOTIC FLUID, THERAPEUTIC FROM PRODUCTS OF CONCEPTION, VIA NATURAL OR ARTIFICIAL OPENING: ICD-10-PCS | Performed by: OBSTETRICS & GYNECOLOGY

## 2024-09-21 PROCEDURE — 88307 TISSUE EXAM BY PATHOLOGIST: CPT | Performed by: OBSTETRICS & GYNECOLOGY

## 2024-09-21 PROCEDURE — 82803 BLOOD GASES ANY COMBINATION: CPT

## 2024-09-21 PROCEDURE — 25010000002 METHYLERGONOVINE MALEATE PER 0.2 MG

## 2024-09-21 RX ORDER — LIDOCAINE HYDROCHLORIDE 20 MG/ML
JELLY TOPICAL AS NEEDED
Status: COMPLETED | OUTPATIENT
Start: 2024-09-21 | End: 2024-09-21

## 2024-09-21 RX ORDER — SERTRALINE HYDROCHLORIDE 25 MG/1
25 TABLET, FILM COATED ORAL NIGHTLY
Status: DISCONTINUED | OUTPATIENT
Start: 2024-09-21 | End: 2024-09-23 | Stop reason: HOSPADM

## 2024-09-21 RX ORDER — ONDANSETRON 4 MG/1
4 TABLET, ORALLY DISINTEGRATING ORAL EVERY 8 HOURS PRN
Status: DISCONTINUED | OUTPATIENT
Start: 2024-09-21 | End: 2024-09-23 | Stop reason: HOSPADM

## 2024-09-21 RX ORDER — IBUPROFEN 600 MG/1
600 TABLET, FILM COATED ORAL EVERY 6 HOURS PRN
Status: DISCONTINUED | OUTPATIENT
Start: 2024-09-21 | End: 2024-09-23 | Stop reason: HOSPADM

## 2024-09-21 RX ORDER — FERROUS SULFATE 325(65) MG
325 TABLET ORAL 2 TIMES DAILY WITH MEALS
Status: DISCONTINUED | OUTPATIENT
Start: 2024-09-21 | End: 2024-09-23 | Stop reason: HOSPADM

## 2024-09-21 RX ORDER — DIPHENHYDRAMINE HCL 25 MG
25 CAPSULE ORAL EVERY 6 HOURS PRN
Status: DISCONTINUED | OUTPATIENT
Start: 2024-09-21 | End: 2024-09-23 | Stop reason: HOSPADM

## 2024-09-21 RX ORDER — OXYTOCIN/0.9 % SODIUM CHLORIDE 30/500 ML
999 PLASTIC BAG, INJECTION (ML) INTRAVENOUS ONCE
Status: DISCONTINUED | OUTPATIENT
Start: 2024-09-21 | End: 2024-09-21 | Stop reason: HOSPADM

## 2024-09-21 RX ORDER — OXYTOCIN/0.9 % SODIUM CHLORIDE 30/500 ML
125 PLASTIC BAG, INJECTION (ML) INTRAVENOUS ONCE AS NEEDED
Status: DISCONTINUED | OUTPATIENT
Start: 2024-09-21 | End: 2024-09-23 | Stop reason: HOSPADM

## 2024-09-21 RX ORDER — HYDROCORTISONE 25 MG/G
CREAM TOPICAL 4 TIMES DAILY PRN
Status: DISCONTINUED | OUTPATIENT
Start: 2024-09-21 | End: 2024-09-23 | Stop reason: HOSPADM

## 2024-09-21 RX ORDER — MISOPROSTOL 200 UG/1
800 TABLET ORAL AS NEEDED
Status: DISCONTINUED | OUTPATIENT
Start: 2024-09-21 | End: 2024-09-21 | Stop reason: HOSPADM

## 2024-09-21 RX ORDER — DOCUSATE SODIUM 100 MG/1
100 CAPSULE, LIQUID FILLED ORAL DAILY
Status: DISCONTINUED | OUTPATIENT
Start: 2024-09-21 | End: 2024-09-23 | Stop reason: HOSPADM

## 2024-09-21 RX ORDER — HYDROCODONE BITARTRATE AND ACETAMINOPHEN 5; 325 MG/1; MG/1
1 TABLET ORAL EVERY 4 HOURS PRN
Status: DISCONTINUED | OUTPATIENT
Start: 2024-09-21 | End: 2024-09-23 | Stop reason: HOSPADM

## 2024-09-21 RX ORDER — ACETAMINOPHEN 325 MG/1
650 TABLET ORAL EVERY 6 HOURS PRN
Status: DISCONTINUED | OUTPATIENT
Start: 2024-09-21 | End: 2024-09-23 | Stop reason: HOSPADM

## 2024-09-21 RX ORDER — ONDANSETRON 4 MG/1
4 TABLET, ORALLY DISINTEGRATING ORAL EVERY 6 HOURS PRN
Status: DISCONTINUED | OUTPATIENT
Start: 2024-09-21 | End: 2024-09-21 | Stop reason: HOSPADM

## 2024-09-21 RX ORDER — LIDOCAINE HYDROCHLORIDE 10 MG/ML
30 INJECTION, SOLUTION INFILTRATION; PERINEURAL ONCE
Status: COMPLETED | OUTPATIENT
Start: 2024-09-21 | End: 2024-09-21

## 2024-09-21 RX ORDER — CALCIUM CARBONATE 500 MG/1
2 TABLET, CHEWABLE ORAL 3 TIMES DAILY PRN
Status: DISCONTINUED | OUTPATIENT
Start: 2024-09-21 | End: 2024-09-23 | Stop reason: HOSPADM

## 2024-09-21 RX ORDER — DEXMEDETOMIDINE HYDROCHLORIDE 100 UG/ML
INJECTION, SOLUTION INTRAVENOUS AS NEEDED
Status: DISCONTINUED | OUTPATIENT
Start: 2024-09-21 | End: 2024-09-21 | Stop reason: SURG

## 2024-09-21 RX ORDER — IBUPROFEN 600 MG/1
600 TABLET, FILM COATED ORAL EVERY 6 HOURS SCHEDULED
Status: DISCONTINUED | OUTPATIENT
Start: 2024-09-21 | End: 2024-09-21 | Stop reason: HOSPADM

## 2024-09-21 RX ORDER — ONDANSETRON 2 MG/ML
4 INJECTION INTRAMUSCULAR; INTRAVENOUS EVERY 6 HOURS PRN
Status: DISCONTINUED | OUTPATIENT
Start: 2024-09-21 | End: 2024-09-21 | Stop reason: HOSPADM

## 2024-09-21 RX ORDER — OXYTOCIN/0.9 % SODIUM CHLORIDE 30/500 ML
250 PLASTIC BAG, INJECTION (ML) INTRAVENOUS CONTINUOUS
Status: ACTIVE | OUTPATIENT
Start: 2024-09-21 | End: 2024-09-21

## 2024-09-21 RX ORDER — HYDROCODONE BITARTRATE AND ACETAMINOPHEN 10; 325 MG/1; MG/1
1 TABLET ORAL EVERY 4 HOURS PRN
Status: DISCONTINUED | OUTPATIENT
Start: 2024-09-21 | End: 2024-09-23 | Stop reason: HOSPADM

## 2024-09-21 RX ORDER — METHYLERGONOVINE MALEATE 0.2 MG/ML
200 INJECTION INTRAVENOUS ONCE
Status: COMPLETED | OUTPATIENT
Start: 2024-09-21 | End: 2024-09-21

## 2024-09-21 RX ORDER — METHYLERGONOVINE MALEATE 0.2 MG/ML
INJECTION INTRAVENOUS
Status: COMPLETED
Start: 2024-09-21 | End: 2024-09-21

## 2024-09-21 RX ADMIN — LIDOCAINE HYDROCHLORIDE 30 ML: 10 INJECTION, SOLUTION EPIDURAL; INFILTRATION; INTRACAUDAL; PERINEURAL at 16:14

## 2024-09-21 RX ADMIN — LIDOCAINE HYDROCHLORIDE: 20 JELLY TOPICAL at 15:48

## 2024-09-21 RX ADMIN — FERROUS SULFATE TAB 325 MG (65 MG ELEMENTAL FE) 325 MG: 325 (65 FE) TAB at 20:35

## 2024-09-21 RX ADMIN — Medication 10 ML/HR: at 12:42

## 2024-09-21 RX ADMIN — BUPIVACAINE HYDROCHLORIDE 5 ML: 2.5 INJECTION, SOLUTION EPIDURAL; INFILTRATION; INTRACAUDAL; PERINEURAL at 07:21

## 2024-09-21 RX ADMIN — WITCH HAZEL: 500 SOLUTION RECTAL; TOPICAL at 18:41

## 2024-09-21 RX ADMIN — SODIUM CHLORIDE, POTASSIUM CHLORIDE, SODIUM LACTATE AND CALCIUM CHLORIDE 125 ML/HR: 600; 310; 30; 20 INJECTION, SOLUTION INTRAVENOUS at 01:15

## 2024-09-21 RX ADMIN — ACETAMINOPHEN 650 MG: 325 TABLET ORAL at 16:32

## 2024-09-21 RX ADMIN — DEXMEDETOMIDINE HYDROCHLORIDE 30 MCG: 100 INJECTION, SOLUTION, CONCENTRATE INTRAVENOUS at 07:21

## 2024-09-21 RX ADMIN — HYDROCORTISONE 2.5%: 25 CREAM TOPICAL at 18:41

## 2024-09-21 RX ADMIN — DOCUSATE SODIUM 100 MG: 100 CAPSULE, LIQUID FILLED ORAL at 20:35

## 2024-09-21 RX ADMIN — Medication 10 ML/HR: at 05:31

## 2024-09-21 RX ADMIN — SODIUM CHLORIDE, POTASSIUM CHLORIDE, SODIUM LACTATE AND CALCIUM CHLORIDE 125 ML/HR: 600; 310; 30; 20 INJECTION, SOLUTION INTRAVENOUS at 09:07

## 2024-09-21 RX ADMIN — METHYLERGONOVINE MALEATE 200 MCG: 0.2 INJECTION, SOLUTION INTRAMUSCULAR; INTRAVENOUS at 16:12

## 2024-09-21 RX ADMIN — IBUPROFEN 600 MG: 600 TABLET, FILM COATED ORAL at 16:33

## 2024-09-21 RX ADMIN — BENZOCAINE AND LEVOMENTHOL: 200; 5 SPRAY TOPICAL at 17:58

## 2024-09-21 RX ADMIN — LIDOCAINE HYDROCHLORIDE 6 ML: 20 JELLY TOPICAL at 15:32

## 2024-09-21 RX ADMIN — METHYLERGONOVINE MALEATE 200 MCG: 0.2 INJECTION INTRAVENOUS at 16:12

## 2024-09-21 RX ADMIN — ONDANSETRON 4 MG: 2 INJECTION INTRAMUSCULAR; INTRAVENOUS at 16:10

## 2024-09-21 NOTE — PROGRESS NOTES
"[facility]  Obstetric Progress Note    Subjective     Patient:    The patient feels well.  Nurse having a had time tracing contractions.  I was called for IUPC placement.    Objective     Vital Signs Range for the last 24 hours  Temp:  [98.2 °F (36.8 °C)-98.5 °F (36.9 °C)] 98.5 °F (36.9 °C)   Temp src: Oral   BP: ()/(46-98) 92/50   Heart Rate:  [] 83            Flowsheet Rows      Flowsheet Row First Filed Value   Admission Height 157.5 cm (62\") Documented at 09/20/2024 0026   Admission Weight 93.9 kg (207 lb) Documented at 09/20/2024 0026              Intake/Output Summary (Last 24 hours) at 9/21/2024 0414  Last data filed at 9/21/2024 0300  Gross per 24 hour   Intake --   Output 1350 ml   Net -1350 ml       I/O this shift:  In: -   Out: 1350 [Urine:1350]    Physical Exam:  General: Patient is well appearing         Cervix: Method: sterile exam per RN   Dilation: Cervical Dilation (cm): 6   Effacement: Cervical Effacement: 80%   Station: Fetal Station: 0-->-3     Fetal Heart Rate Assessment   Method: Fetal HR Assessment Method: external   Beats/min: Fetal HR (beats/min): 120   Baseline: Fetal HR Baseline: normal range   Variability: Fetal HR Variability: moderate (amplitude range 6 to 25 bpm)   Accels: Fetal HR Accelerations: greater than/equal to 15 bpm, lasting at least 15 seconds   Decels: Fetal HR Decelerations: early, variable     Uterine Assessment   Method: Method: external tocotransducer, per patient report, palpation   Frequency (min): Contraction Frequency (Minutes): 2-4   Ctx Count in 10 min:     Duration:     Intensity: Contraction Intensity: moderate by palpation   Intensity by IUPC:     Resting Tone: Uterine Resting Tone: soft by palpation   Resting Tone by IUPC:     [place] Units:       IUPC placed without complication.    Assessment & Plan       * No active hospital problems. *        Assessment:  1.  Intrauterine pregnancy at 37w1d gestation with reactive fetal status.  "       Plan:  1.  Continue Pitocin  2.  Plan of care has been reviewed with patient   3.  All questions have been answered.  4.  Anticipate .  Dr Lund to deliver.    Carly Combs MD    Electronically signed by Carly Combs MD, 24, 4:14 AM EDT.

## 2024-09-21 NOTE — PROGRESS NOTES
" [facility]  Obstetric Progress Note    Subjective     Patient:    The patient feels uncomfortable in the pelvic region.      Objective     Vital Signs Range for the last 24 hours  Temp:  [98 °F (36.7 °C)-98.5 °F (36.9 °C)] 98.2 °F (36.8 °C)   Temp src: Oral   BP: ()/(46-98) 113/63   Heart Rate:  [] 92       SpO2:  [98 %-100 %] 98 %                   Flowsheet Rows      Flowsheet Row First Filed Value   Admission Height 157.5 cm (62\") Documented at 09/20/2024 0026   Admission Weight 93.9 kg (207 lb) Documented at 09/20/2024 0026            Intake/Output last 24 hours:      Intake/Output Summary (Last 24 hours) at 9/21/2024 1316  Last data filed at 9/21/2024 0700  Gross per 24 hour   Intake --   Output 2500 ml   Net -2500 ml       Intake/Output this shift:    No intake/output data recorded.    Physical Exam:  General: Patient is uncomfortable with contractions.  Primarily in vaginal and rectal regions   Heart CVS exam: normal rate, regular rhythm,    Lungs Chest: Nonlabored respirations; symmetric air entry.     Abdomen Abdominal exam: Regular uterine contractions palpate strong.  IUPC is in place   Extremities Exam of extremities: 1+ pedal edema, no clubbing or cyanosis     Presentation: Vertex   Cervix: Exam by: Method: sterile exam per    Dilation: Cervical Dilation (cm): 9+   Effacement: Cervical Effacement: 100%   Station: Fetal Station: 2+         Fetal Heart Rate Assessment   Method: Fetal HR Assessment Method: external   Beats/min: Fetal HR (beats/min): 140   Baseline: Fetal HR Baseline: normal range   Variability: Fetal HR Variability: moderate (amplitude range 6 to 25 bpm)   Accels: Fetal HR Accelerations: absent   Decels: Fetal HR Decelerations: absent   Tracing Category:       Uterine Assessment   Method: Method: palpation, IUPC (intrauterine pressure catheter)   Frequency (min): Contraction Frequency (Minutes): 2-3   Ctx Count in 10 min:     Duration:     Intensity: Contraction Intensity: " moderate by palpation   Intensity by IUPC: Contraction Intensity (mmHg) by IUPC: 60-70   Resting Tone: Uterine Resting Tone: soft by palpation   Resting Tone by IUPC: Uterine Resting Tone (mmHg) by IUPC: 30   [place] Units:         Assessment & Plan       * No active hospital problems. *        Assessment:  1.  Intrauterine pregnancy at 37w1d gestation with reactive fetal status.    2.  induction of labor  for GDM A2  transitioning to stage II labor  3.  Obstetrical history significant for is non-contributory.  4.  GBS status: Negative    Plan:  1. Vaginal anticipated  2. Plan of care has been reviewed with patient, nurse and poor persons at bedside  3.  Risks, benefits of treatment plan have been discussed.  4.  All questions have been answered.  5.  Prepare for stage II labor      Odalis Lund MD  9/21/2024  13:16 EDT

## 2024-09-21 NOTE — L&D DELIVERY NOTE
[facility]   Vaginal Delivery Note    Patient Name: Bekah Rubin  : 2000  MRN: [id number]    Date of Delivery: 2024     Diagnosis     Pre & Post-Delivery:  Intrauterine pregnancy at 37w1d  Labor status: Induced Onset of Labor     * No active hospital problems. *             Problem List    Transfer to Postpartum     Review the Delivery Report for details.     Delivery     Delivery: Vaginal, Spontaneous     YOB: 2023    Time of Birth:  Gestational Age 2:56 PM   37w1d     Anesthesia: Epidural     Delivering clinician: Odalis Lund    Forceps?   No   Vacuum? No    Shoulder dystocia present: No        Delivery narrative: Admitted for induction of labor at 37 weeks due to GDM A2, poor control; accelerated fetal weight.  She received Cytotec, Cook catheter and IV Pitocin.  Additionally she had IV sedation and epidural analgesia.  She had AROM and IUPC.  She progressed to have a spontaneous vaginal delivery of a liveborn male infant.  He was bulb suction and laid on the mother's abdomen where he was attended to by the nursery personnel.  The umbilical cord was noted to have a true knot.  After approximately 1 minute the umbilical cord was clamped in 2 places.  It was then cut in between by the father.  Samples of blood were collected from the umbilical cord for arterial and venous blood gas analysis as well as Clarice testing.  There was spontaneous delivery intact placenta with three-vessel cord and trailing membranes.  The uterus was massaged and the cavity explored with sponge on a stick.  1% lidocaine 30 cc was infiltrated for repair of the vaginal and perineal lacerations.  2% lidocaine a total of 4 cc of gel was also used.  The patient did bolus dose her epidural as well.  Repair of the perineal laceration was carried out with 3-0 Monocryl in layers for good reapproximation and hemostasis.  The vaginal lacerations did not require repair.  The uterus was once again massaged  and the cavity once again explored.  AMARILIS is intact.  External hemorrhoids are present.  The placenta sent to pathology for analysis.   cc.  Sponge counts are correct.  Mom and infant are in the LDR in satisfactory condition.      Infant     Findings: male  infant     Infant observations: Weight: 3465 g (7 lb 10.2 oz)   Length: 20.5  in  Observations/Comments:        Apgars: 8  @ 1 minute /    9  @ 5 minutes   Infant Name: Chadwick     Placenta & Cord         Placenta delivered  Spontaneous  at   9/21/2024  3:18 PM     Cord: 3 vessels  present.   Nuchal Cord?  no   Cord blood obtained: Yes    Cord gases obtained:  Yes    Cord gas results: Venous:    pH, Cord Venous   Date Value Ref Range Status   09/21/2024 7.353 7.260 - 7.400 pH Units Final     Base Excess, Cord Venous   Date Value Ref Range Status   09/21/2024 -5.6 -30.0 - 30.0 mmol/L Final     Comment:     Serial Number: [id number]:  [id number]       Arterial:    pH, Cord Arterial   Date Value Ref Range Status   09/21/2024 7.20 7.18 - 7.34 pH Units Final     Base Exc, Cord Arterial   Date Value Ref Range Status   09/21/2024 -8.6 (L) -2.0 - 2.0 mmol/L Final     Comment:     Serial Number: [id number]:  [id number]        Repair     Episiotomy: None     No    Lacerations: Yes  Laceration Information  Laceration Repaired?   Perineal: 2nd      Periurethral:       Labial:       Sulcus:       Vaginal: Yes      Cervical:         Suture used for repair: 3-0 Monocryl     Estimated Blood Loss:       Quantitative Blood Loss:          Complications     none    Disposition     Mother to Remain in LD  in stable condition currently.  Baby to remains with mom  in stable condition currently.    Odalis Lund MD  09/21/24  16:52 EDT

## 2024-09-21 NOTE — PROGRESS NOTES
"[facility]  Obstetric Progress Note    Subjective     Patient:    The patient feels well.  Here for AROM.    Objective     Vital Signs Range for the last 24 hours  Temp:  [97.8 °F (36.6 °C)-98.3 °F (36.8 °C)] 98.3 °F (36.8 °C)   Temp src: Oral   BP: ()/(46-98) 126/76   Heart Rate:  [] 98            Flowsheet Rows      Flowsheet Row First Filed Value   Admission Height 157.5 cm (62\") Documented at 2024 0026   Admission Weight 93.9 kg (207 lb) Documented at 2024 0026            No intake or output data in the 24 hours ending 24 0150    No intake/output data recorded.    Physical Exam:  General: Patient is well appearing         Cervix: Method: sterile exam per physician   Dilation: Cervical Dilation (cm): 6   Effacement: Cervical Effacement: 80%   Station: Fetal Station: 0-->-3     Fetal Heart Rate Assessment   Method: Fetal HR Assessment Method: external   Beats/min: Fetal HR (beats/min): 120   Baseline: Fetal HR Baseline: normal range   Variability: Fetal HR Variability: moderate (amplitude range 6 to 25 bpm)   Accels: Fetal HR Accelerations: greater than/equal to 15 bpm, lasting at least 15 seconds   Decels: Fetal HR Decelerations: absent     Uterine Assessment   Method: Method: external tocotransducer, per patient report, palpation   Frequency (min): Contraction Frequency (Minutes): 1-4   Ctx Count in 10 min:     Duration:     Intensity: Contraction Intensity: moderate by palpation   Intensity by IUPC:     Resting Tone: Uterine Resting Tone: soft by palpation   Resting Tone by IUPC:     [place] Units:         AROM with clear fluid noted.  Bloody show also seen.    Assessment & Plan       * No active hospital problems. *        Assessment:  1.  Intrauterine pregnancy at 37w1d gestation with reactive fetal status.        Plan:  1.  Continue Pitocin  2.  Plan of care has been reviewed with patient   3.  All questions have been answered.  4.  Anticipate TERRELL Combs " MD      Electronically signed by Carly Combs MD, 09/21/24, 1:50 AM EDT.

## 2024-09-21 NOTE — PLAN OF CARE
Problem: Adult Inpatient Plan of Care  Goal: Plan of Care Review  Outcome: Ongoing, Progressing  Goal: Patient-Specific Goal (Individualized)  Outcome: Ongoing, Progressing  Goal: Absence of Hospital-Acquired Illness or Injury  Outcome: Ongoing, Progressing  Intervention: Identify and Manage Fall Risk  Recent Flowsheet Documentation  Taken 9/20/2024 1930 by Maria Del Rosario Rivera RNA  Safety Promotion/Fall Prevention: safety round/check completed  Intervention: Prevent and Manage VTE (Venous Thromboembolism) Risk  Recent Flowsheet Documentation  Taken 9/20/2024 1930 by Maria Del Rosario Rivera RNA  Activity Management: (due to epidural placement) bedrest  VTE Prevention/Management:   bilateral   sequential compression devices on  Goal: Optimal Comfort and Wellbeing  Outcome: Ongoing, Progressing  Goal: Readiness for Transition of Care  Outcome: Ongoing, Progressing     Problem: Bleeding (Labor)  Goal: Hemostasis  Outcome: Ongoing, Progressing     Problem: Change in Fetal Wellbeing (Labor)  Goal: Stable Fetal Wellbeing  Outcome: Ongoing, Progressing     Problem: Delayed Labor Progression (Labor)  Goal: Effective Progression to Delivery  Outcome: Ongoing, Progressing     Problem: Infection (Labor)  Goal: Absence of Infection Signs and Symptoms  Outcome: Ongoing, Progressing     Problem: Labor Pain (Labor)  Goal: Acceptable Pain Control  Outcome: Ongoing, Progressing     Problem: Uterine Tachysystole (Labor)  Goal: Normal Uterine Contraction Pattern  Outcome: Ongoing, Progressing   Goal Outcome Evaluation:

## 2024-09-21 NOTE — NURSING NOTE
From 3156-1213, FHR machine malfunction. Monitor doubling actual FHR. Monitors unplugged and problem resolved

## 2024-09-22 RX ADMIN — BENZOCAINE AND LEVOMENTHOL: 200; 5 SPRAY TOPICAL at 15:48

## 2024-09-22 RX ADMIN — FERROUS SULFATE TAB 325 MG (65 MG ELEMENTAL FE) 325 MG: 325 (65 FE) TAB at 17:42

## 2024-09-22 RX ADMIN — DOCUSATE SODIUM 100 MG: 100 CAPSULE, LIQUID FILLED ORAL at 08:06

## 2024-09-22 RX ADMIN — IBUPROFEN 600 MG: 600 TABLET, FILM COATED ORAL at 04:51

## 2024-09-22 RX ADMIN — FERROUS SULFATE TAB 325 MG (65 MG ELEMENTAL FE) 325 MG: 325 (65 FE) TAB at 08:06

## 2024-09-22 RX ADMIN — ACETAMINOPHEN 650 MG: 325 TABLET ORAL at 15:48

## 2024-09-22 NOTE — PROGRESS NOTES
" [facility]   Vaginal Delivery Progress Note    Subjective   Postpartum Day 1: Vaginal Delivery    The patient feels well.  Her pain is well controlled with nonsteroidal anti-inflammatory drugs.   She is ambulating well.  Patient describes her bleeding as moderate lochia.    Breastfeeding: infant latching.    Objective     Vital Signs Range for the last 24 hours  Temperature: Temp:  [97.7 °F (36.5 °C)-99.4 °F (37.4 °C)] 97.7 °F (36.5 °C)   Temp Source: Temp src: Oral   BP: BP: (104-141)/(51-98) 123/84   Pulse: Heart Rate:  [] 111   Respirations: Resp:  [16-22] 20   SPO2: SpO2:  [98 %-99 %] 99 %   O2 Amount (l/min):     O2 Devices Device (Oxygen Therapy): room air   Weight:       Admit Height:  Height: 157.5 cm (62\")      Physical Exam:  General:  no acute distress.  Abdomen: Fundus: appropriate, firm, non tender  Extremities: normal, atraumatic, no cyanosis, and trace edema.       Lab results reviewed:  Yes   Rubella:  No results found for: \"RUBELLAIGGIN\" Nurse Transcribed from prenatal record --    External Rubella Qual   Date Value Ref Range Status   03/14/2024 Immune  Final     Rh Status:    RH type   Date Value Ref Range Status   09/20/2024 Positive  Final     Immunizations: There is no immunization history for the selected administration types on file for this patient.    Assessment & Plan       Insulin controlled White classification A2 gestational diabetes mellitus (GDM)    Postpartum care and examination immediately after delivery    Other psychoactive substance abuse, in remission    External hemorrhoids    Anxiety    Hepatitis C    Class 2 obesity with body mass index (BMI) of 37.0 to 37.9 in adult    Herpes      Vaginal, Spontaneous   .      Plan:  Continue current care.      Odalis Lund MD  9/22/2024  12:56 EDT  "

## 2024-09-22 NOTE — PLAN OF CARE
Problem: Adult Inpatient Plan of Care  Goal: Plan of Care Review  Outcome: Ongoing, Progressing  Goal: Patient-Specific Goal (Individualized)  Outcome: Ongoing, Progressing  Goal: Absence of Hospital-Acquired Illness or Injury  Outcome: Ongoing, Progressing  Intervention: Identify and Manage Fall Risk  Recent Flowsheet Documentation  Taken 9/21/2024 2038 by Maria Del Rosario Rivera RNA  Safety Promotion/Fall Prevention: safety round/check completed  Goal: Optimal Comfort and Wellbeing  Outcome: Ongoing, Progressing  Goal: Readiness for Transition of Care  Outcome: Ongoing, Progressing     Problem: Bleeding (Labor)  Goal: Hemostasis  Outcome: Ongoing, Progressing     Problem: Change in Fetal Wellbeing (Labor)  Goal: Stable Fetal Wellbeing  Outcome: Ongoing, Progressing     Problem: Delayed Labor Progression (Labor)  Goal: Effective Progression to Delivery  Outcome: Ongoing, Progressing     Problem: Infection (Labor)  Goal: Absence of Infection Signs and Symptoms  Outcome: Ongoing, Progressing     Problem: Labor Pain (Labor)  Goal: Acceptable Pain Control  Outcome: Ongoing, Progressing     Problem: Uterine Tachysystole (Labor)  Goal: Normal Uterine Contraction Pattern  Outcome: Ongoing, Progressing   Goal Outcome Evaluation:

## 2024-09-22 NOTE — PLAN OF CARE
Goal Outcome Evaluation:      Patient progressing on current poc, up ad otto independently, performs self care, VS WNL, I&O wnl, lochia wnl, adequate pain management, mother infant bonding wnl, will continue with current poc,

## 2024-09-23 VITALS
RESPIRATION RATE: 18 BRPM | BODY MASS INDEX: 38.09 KG/M2 | TEMPERATURE: 98.1 F | SYSTOLIC BLOOD PRESSURE: 133 MMHG | HEIGHT: 62 IN | OXYGEN SATURATION: 99 % | WEIGHT: 207 LBS | DIASTOLIC BLOOD PRESSURE: 72 MMHG | HEART RATE: 97 BPM

## 2024-09-23 RX ORDER — IBUPROFEN 600 MG/1
600 TABLET, FILM COATED ORAL EVERY 6 HOURS PRN
Qty: 30 TABLET | Refills: 0 | Status: SHIPPED | OUTPATIENT
Start: 2024-09-23

## 2024-09-23 RX ORDER — HYDROCORTISONE 25 MG/G
CREAM TOPICAL 4 TIMES DAILY PRN
Qty: 60 G | Refills: 0 | Status: SHIPPED | OUTPATIENT
Start: 2024-09-23

## 2024-09-23 RX ORDER — ACETAMINOPHEN 325 MG/1
650 TABLET ORAL EVERY 6 HOURS PRN
Qty: 20 TABLET | Refills: 0 | Status: SHIPPED | OUTPATIENT
Start: 2024-09-23

## 2024-09-23 RX ADMIN — HYDROCODONE BITARTRATE AND ACETAMINOPHEN 1 TABLET: 5; 325 TABLET ORAL at 00:35

## 2024-09-23 RX ADMIN — FERROUS SULFATE TAB 325 MG (65 MG ELEMENTAL FE) 325 MG: 325 (65 FE) TAB at 08:26

## 2024-09-23 RX ADMIN — HYDROCODONE BITARTRATE AND ACETAMINOPHEN 1 TABLET: 10; 325 TABLET ORAL at 08:26

## 2024-09-23 RX ADMIN — WITCH HAZEL: 500 SOLUTION RECTAL; TOPICAL at 06:37

## 2024-09-23 RX ADMIN — DOCUSATE SODIUM 100 MG: 100 CAPSULE, LIQUID FILLED ORAL at 08:26

## 2024-09-23 NOTE — DISCHARGE SUMMARY
" [facility]  Delivery Discharge Summary    Primary OB Clinician:     EDC: Estimated Date of Delivery: 10/11/24    Admitting Diagnosis:  Insulin controlled White classification A2 gestational diabetes mellitus (GDM) [O24.414]    Discharge Diagnosis:  Same as Admitting plus:   Pregnancy at 37w1d - Delivered     Antepartum complications: gestational diabetes and infection    Date of Delivery: 2024   Time of Delivery: 2:56 PM     Delivered By:  Odalis Lund     Delivery Type: Vaginal, Spontaneous      Tubal Ligation: n/a    Baby:male  infant;   Apgar:  8  @ 1 minute /   Apgar:  9  @ 5 minutes   Weight: 3465 g (7 lb 10.2 oz)    Length: 20.5     Anesthesia: Epidural      Intrapartum complications: None    Laceration: Yes  Laceration Information  Laceration Repaired?   Perineal: 2nd      Periurethral:       Labial:       Sulcus:       Vaginal: Yes      Cervical:         Suture used for repair: 3-0 Monocryl    Episiotomy: No    Placenta: Spontaneous     Feeding method: Breastfeeding Status: Yes    Rh Immune globulin given: not applicable    Rubella vaccine given: not applicable  l Delivery Progress Note    Subjective   Subjective  Postpartum Day 2: Vaginal Delivery    The patient feels well.  Her pain is well controlled with nonsteroidal anti-inflammatory drugs and Tylenol.   She is ambulating well.  Patient describes her bleeding as moderate lochia.    Breastfeeding: with difficulty.  Has decided to pump instead.  Objective     Objective   Vital Signs Range for the last 24 hours  Temperature: Temp:  [97.7 °F (36.5 °C)-98.6 °F (37 °C)] 98.1 °F (36.7 °C)   Temp Source: Temp src: Oral   BP: BP: (107-133)/(64-72) 133/72   Pulse: Heart Rate:  [] 97   Respirations: Resp:  [18] 18   SPO2:     O2 Amount (l/min):     O2 Devices Device (Oxygen Therapy): room air   Weight:       Admit Height:  Height: 157.5 cm (62\")    Physical Exam:  General:  no acute distresss.  Abdomen: Fundus: appropriate, firm, non " tender  Extremities: normal, atraumatic, no cyanosis, and trace edema.   Hospital course:  Admitted for induction of labor at 37 weeks for poorly controlled insulin-dependent gestational diabetes.  She received Cytotec IV Pitocin Cook catheter for cervical ripening.  She had IV sedation and epidural analgesia.  She progressed to have a spontaneous vaginal delivery.  She did experience vaginal and perineal lacerations that were repaired without complication.  She has met the expected milestones during the postpartum course.  She is tolerating p.o. foods fluids, voiding and reporting adequate pain management.  She has anxiety and issues with depression and would like to have medication to go home with.  She previously she was ordered sertraline but did not take it because she did not have a good experience with that 8 years ago.  She will maintain pelvic rest.  She will return to the hospital or call should she have excessive bleeding, infection or issues with depression.    Encounter time to complete discharge over 30 minutes.  Discharge Date: 9/23/2024; Discharge Time: 12:09 EDT        Plan:      Follow-up appointment with Dr. Lund in 2 weeks.     Electronically signed by Odalis Lund MD, 09/23/24, 12:09 PM EDT.

## 2024-09-23 NOTE — PLAN OF CARE
Problem: Adult Inpatient Plan of Care  Goal: Plan of Care Review  Outcome: Ongoing, Progressing  Goal: Patient-Specific Goal (Individualized)  Outcome: Ongoing, Progressing  Goal: Absence of Hospital-Acquired Illness or Injury  Outcome: Ongoing, Progressing  Intervention: Identify and Manage Fall Risk  Recent Flowsheet Documentation  Taken 9/23/2024 0541 by Aneta Tavares RN  Safety Promotion/Fall Prevention: safety round/check completed  Taken 9/23/2024 0420 by Aneta Tavares RN  Safety Promotion/Fall Prevention: safety round/check completed  Taken 9/23/2024 0300 by Aneta Tavares RN  Safety Promotion/Fall Prevention: safety round/check completed  Taken 9/22/2024 2320 by Aneta Tavares RN  Safety Promotion/Fall Prevention: safety round/check completed  Taken 9/22/2024 2100 by Aneta Tavares RN  Safety Promotion/Fall Prevention: safety round/check completed  Taken 9/22/2024 1915 by Aneta Tavares RN  Safety Promotion/Fall Prevention: safety round/check completed  Intervention: Prevent Skin Injury  Recent Flowsheet Documentation  Taken 9/22/2024 2100 by Aneta Tavares RN  Body Position: position changed independently  Intervention: Prevent and Manage VTE (Venous Thromboembolism) Risk  Recent Flowsheet Documentation  Taken 9/22/2024 2100 by Aneta Tavares RN  Activity Management: up ad otto  VTE Prevention/Management:   sequential compression devices on   bilateral  Intervention: Prevent Infection  Recent Flowsheet Documentation  Taken 9/22/2024 2100 by Aneta Tavares RN  Infection Prevention:   visitors restricted/screened   rest/sleep promoted   personal protective equipment utilized   hand hygiene promoted   equipment surfaces disinfected   environmental surveillance performed  Goal: Optimal Comfort and Wellbeing  Outcome: Ongoing, Progressing  Intervention: Monitor Pain and Promote Comfort  Recent Flowsheet Documentation  Taken 9/23/2024 0035 by Aneta Tavares RN  Pain Management  Interventions: see MAR  Goal: Readiness for Transition of Care  Outcome: Ongoing, Progressing     Problem: Adjustment to Role Transition (Postpartum  Delivery)  Goal: Successful Maternal Role Transition  Outcome: Ongoing, Progressing     Problem: Bleeding (Postpartum  Delivery)  Goal: Hemostasis  Outcome: Ongoing, Progressing     Problem: Infection (Postpartum  Delivery)  Goal: Absence of Infection Signs and Symptoms  Outcome: Ongoing, Progressing     Problem: Pain (Postpartum  Delivery)  Goal: Acceptable Pain Control  Outcome: Ongoing, Progressing  Intervention: Prevent or Manage Pain  Recent Flowsheet Documentation  Taken 2024 0035 by Aneta Tavares RN  Pain Management Interventions: see MAR     Problem: Postoperative Nausea and Vomiting (Postpartum  Delivery)  Goal: Nausea and Vomiting Relief  Outcome: Ongoing, Progressing     Problem: Postoperative Urinary Retention (Postpartum  Delivery)  Goal: Effective Urinary Elimination  Outcome: Ongoing, Progressing   Goal Outcome Evaluation:

## 2024-09-23 NOTE — NURSING NOTE
Discharge instructions reviewed with patient. Verbalized understanding of discharge instructions, with no questions or concerns voiced.

## 2024-09-23 NOTE — PLAN OF CARE
Problem: Adult Inpatient Plan of Care  Goal: Plan of Care Review  Outcome: Met  Goal: Patient-Specific Goal (Individualized)  Outcome: Met  Goal: Absence of Hospital-Acquired Illness or Injury  Outcome: Met  Intervention: Identify and Manage Fall Risk  Recent Flowsheet Documentation  Taken 2024 1200 by Maria E Berkowitz RN  Safety Promotion/Fall Prevention: safety round/check completed  Taken 2024 1100 by Maria E Berkowitz RN  Safety Promotion/Fall Prevention: safety round/check completed  Taken 2024 0823 by Maria E Berkowitz RN  Safety Promotion/Fall Prevention: safety round/check completed  Taken 2024 0700 by Maria E Berkowitz RN  Safety Promotion/Fall Prevention: safety round/check completed  Intervention: Prevent Skin Injury  Recent Flowsheet Documentation  Taken 2024 0826 by Maria E Berkowitz RN  Body Position: position changed independently  Goal: Optimal Comfort and Wellbeing  Outcome: Met  Intervention: Monitor Pain and Promote Comfort  Recent Flowsheet Documentation  Taken 2024 0826 by Maria E Berkowitz RN  Pain Management Interventions:   care clustered   pillow support provided   position adjusted   quiet environment facilitated   see MAR   relaxation techniques promoted  Goal: Readiness for Transition of Care  Outcome: Met     Problem: Adjustment to Role Transition (Postpartum  Delivery)  Goal: Successful Maternal Role Transition  Outcome: Met     Problem: Bleeding (Postpartum  Delivery)  Goal: Hemostasis  Outcome: Met     Problem: Infection (Postpartum  Delivery)  Goal: Absence of Infection Signs and Symptoms  Outcome: Met     Problem: Pain (Postpartum  Delivery)  Goal: Acceptable Pain Control  Outcome: Met  Intervention: Prevent or Manage Pain  Recent Flowsheet Documentation  Taken 2024 08 by Maria E Berkowitz RN  Pain Management Interventions:   care clustered   pillow support provided   position adjusted   quiet environment facilitated   see  MAR   relaxation techniques promoted     Problem: Postoperative Nausea and Vomiting (Postpartum  Delivery)  Goal: Nausea and Vomiting Relief  Outcome: Met     Problem: Postoperative Urinary Retention (Postpartum  Delivery)  Goal: Effective Urinary Elimination  Outcome: Met   Goal Outcome Evaluation:

## 2024-09-25 LAB
CYTO UR: NORMAL
LAB AP CASE REPORT: NORMAL
LAB AP CLINICAL INFORMATION: NORMAL
PATH REPORT.FINAL DX SPEC: NORMAL
PATH REPORT.GROSS SPEC: NORMAL

## 2024-10-02 ENCOUNTER — MATERNAL SCREENING (OUTPATIENT)
Dept: CALL CENTER | Facility: HOSPITAL | Age: 24
End: 2024-10-02
Payer: COMMERCIAL

## 2024-10-02 NOTE — OUTREACH NOTE
Maternal Screening Survey      Flowsheet Row Responses   Facility patient discharged from? [facility]   Attempt successful? No   Unsuccessful attempts Attempt 1              Ashly SMITH - Registered Nurse

## 2024-10-02 NOTE — OUTREACH NOTE
Maternal Screening Survey      Flowsheet Row Responses   Facility patient discharged from? [facility]   Attempt successful? No   Unsuccessful attempts Attempt 2              Ashly SMITH - Registered Nurse

## 2024-10-03 ENCOUNTER — MATERNAL SCREENING (OUTPATIENT)
Dept: CALL CENTER | Facility: HOSPITAL | Age: 24
End: 2024-10-03
Payer: COMMERCIAL

## 2024-10-03 NOTE — OUTREACH NOTE
Maternal Screening Survey      Flowsheet Row Responses   Facility patient discharged from? [facility]   Attempt successful? No   Unsuccessful attempts Attempt 3   Revoke Decline to participate              Jeny RODRIGUEZ - Registered Nurse

## 2024-12-23 ENCOUNTER — HOSPITAL ENCOUNTER (EMERGENCY)
Facility: HOSPITAL | Age: 24
Discharge: HOME | End: 2024-12-23
Attending: EMERGENCY MEDICINE | Admitting: EMERGENCY MEDICINE
Payer: COMMERCIAL

## 2024-12-23 ENCOUNTER — APPOINTMENT (OUTPATIENT)
Dept: GENERAL RADIOLOGY | Facility: HOSPITAL | Age: 24
End: 2024-12-23
Payer: COMMERCIAL

## 2024-12-23 VITALS
OXYGEN SATURATION: 100 % | DIASTOLIC BLOOD PRESSURE: 86 MMHG | WEIGHT: 189.15 LBS | SYSTOLIC BLOOD PRESSURE: 128 MMHG | RESPIRATION RATE: 16 BRPM | HEART RATE: 66 BPM | BODY MASS INDEX: 34.81 KG/M2 | TEMPERATURE: 98.2 F | HEIGHT: 62 IN

## 2024-12-23 DIAGNOSIS — K59.00 CONSTIPATION, UNSPECIFIED CONSTIPATION TYPE: Primary | ICD-10-CM

## 2024-12-23 PROCEDURE — 99283 EMERGENCY DEPT VISIT LOW MDM: CPT

## 2024-12-23 PROCEDURE — 74018 RADEX ABDOMEN 1 VIEW: CPT

## 2024-12-23 PROCEDURE — 99284 EMERGENCY DEPT VISIT MOD MDM: CPT

## 2024-12-23 RX ORDER — MAGNESIUM CARB/ALUMINUM HYDROX 105-160MG
296 TABLET,CHEWABLE ORAL ONCE
Status: COMPLETED | OUTPATIENT
Start: 2024-12-23 | End: 2024-12-23

## 2024-12-23 RX ORDER — BISACODYL 10 MG
10 SUPPOSITORY, RECTAL RECTAL DAILY PRN
Qty: 5 EACH | Refills: 0 | Status: SHIPPED | OUTPATIENT
Start: 2024-12-23

## 2024-12-23 RX ADMIN — DOCUSATE SODIUM 1 ENEMA: 283 LIQUID RECTAL at 13:32

## 2024-12-23 RX ADMIN — MAJOR MAGNESIUM CITRATE ORAL SOLUTION - LEMON 296 ML: 1.75 LIQUID ORAL at 13:32

## 2024-12-23 NOTE — DISCHARGE INSTRUCTIONS
Increase your intake of water and fiber in your diet.  You should also consider increasing your physical activity, such as taking a walk, which can also help stimulate a bowel movement.  Continue taking the MiraLAX that you have daily for the next week to help prevent constipation.  Follow-up with your primary care provider.

## 2024-12-23 NOTE — ED PROVIDER NOTES
Time: 12:17 PM EST  Date of encounter:  12/23/2024  Independent Historian/Clinical History and Information was obtained by:   Patient    History is limited by: N/A    Chief Complaint: Constipation      History of Present Illness:  Patient is a 24 y.o. year old female who presents to the emergency department for evaluation of constipation.  Patient reports she has not had a normal bowel movement in the past week.  She reports generalized abdominal discomfort but denies nausea or vomiting.  Patient reports history of hemorrhoids, also reports she was straining to have a bowel movement earlier today and experienced a small amount of rectal bleeding and rectal pain.  She reports that she took 1 dose of MiraLAX prior to coming to the ED but has used no other over-the-counter treatments.      Patient Care Team  Primary Care Provider: Yahaira Lauren APRN    Past Medical History:     No Known Allergies  Past Medical History:   Diagnosis Date    Hepatitis C 2022    Herpes     Herpes     Hyperlipidemia      No past surgical history on file.  No family history on file.    Home Medications:  Prior to Admission medications    Medication Sig Start Date End Date Taking? Authorizing Provider   acetaminophen (TYLENOL) 325 MG tablet Take 2 tablets by mouth Every 6 (Six) Hours As Needed for Mild Pain (Second Line: Mild pain unrelieved by ibuprofen. Stagger doses 3 hours after dose of ibuprofen.). 9/23/24   Odalis Ludn MD   ferrous sulfate 325 (65 FE) MG tablet Take 1 tablet by mouth 3 (Three) Times a Week.    Provider, MD Manjit   FLUoxetine (PROzac) 20 MG capsule Take 1 capsule by mouth Daily. 9/23/24   Odalis Lund MD   Hydrocortisone, Perianal, (ANUSOL-HC) 2.5 % rectal cream Insert  into the rectum 4 (Four) Times a Day As Needed for Hemorrhoids. Indications: Inflamed Hemorrhoids, Itching of the Anus and/or Genital Area 9/23/24   Odalis Lund MD   ibuprofen (ADVIL,MOTRIN) 600 MG tablet Take 1 tablet  "by mouth Every 6 (Six) Hours As Needed for Moderate Pain (First Line: Mild pain.). 9/23/24   Odalis Lund MD   prenatal vitamin (prenatal, CLASSIC, vitamin) tablet Take  by mouth Daily.    Provider, MD Manjit   valACYclovir (VALTREX) 500 MG tablet Take 1 tablet by mouth As Needed. 3/24/23   Provider, MD Manjit        Social History:   Social History     Tobacco Use    Smoking status: Former     Current packs/day: 0.50     Types: Cigarettes     Passive exposure: Past    Smokeless tobacco: Never   Vaping Use    Vaping status: Every Day    Substances: Nicotine, Flavoring   Substance Use Topics    Alcohol use: Never    Drug use: Not Currently     Types: Methamphetamines, IV     Comment: former user last use 5/3/22         Review of Systems:  Review of Systems   Constitutional:  Negative for fever.   HENT:  Negative for sore throat.    Eyes: Negative.    Respiratory:  Negative for cough and shortness of breath.    Cardiovascular:  Negative for chest pain.   Gastrointestinal:  Positive for anal bleeding and constipation. Negative for abdominal pain, diarrhea and vomiting.   Genitourinary:  Negative for dysuria.   Musculoskeletal:  Negative for neck pain.   Skin:  Negative for rash.   Allergic/Immunologic: Negative.    Neurological:  Negative for weakness, numbness and headaches.   Hematological: Negative.    Psychiatric/Behavioral: Negative.     All other systems reviewed and are negative.       Physical Exam:  /88   Pulse 62   Temp 98.5 °F (36.9 °C)   Resp 20   Ht 157.5 cm (62\")   Wt 85.8 kg (189 lb 2.5 oz)   SpO2 100%   BMI 34.60 kg/m²     Physical Exam  Vitals and nursing note reviewed.   Constitutional:       General: She is not in acute distress.     Appearance: Normal appearance. She is not toxic-appearing.   HENT:      Head: Normocephalic and atraumatic.      Jaw: There is normal jaw occlusion.   Eyes:      General: Lids are normal.      Extraocular Movements: Extraocular movements " intact.      Conjunctiva/sclera: Conjunctivae normal.      Pupils: Pupils are equal, round, and reactive to light.   Cardiovascular:      Rate and Rhythm: Normal rate and regular rhythm.      Pulses: Normal pulses.      Heart sounds: Normal heart sounds.   Pulmonary:      Effort: Pulmonary effort is normal. No respiratory distress.      Breath sounds: Normal breath sounds. No wheezing or rhonchi.   Abdominal:      General: Abdomen is flat.      Palpations: Abdomen is soft.      Tenderness: There is no abdominal tenderness. There is no guarding or rebound.   Musculoskeletal:         General: Normal range of motion.      Cervical back: Normal range of motion and neck supple.      Right lower leg: No edema.      Left lower leg: No edema.   Skin:     General: Skin is warm and dry.   Neurological:      Mental Status: She is alert and oriented to person, place, and time. Mental status is at baseline.   Psychiatric:         Mood and Affect: Mood normal.              Medical Decision Making:      Comorbidities that affect care:    History of hepatitis C, Smoking, Obesity    External Notes reviewed:    Hospital Discharge Summary: OB/GYN discharge summary from 9/20/2024 after patient had spontaneous vaginal delivery on 9/21/2024      The following orders were placed and all results were independently analyzed by me:  Orders Placed This Encounter   Procedures    XR Abdomen KUB       Medications Given in the Emergency Department:  Medications   docusate sodium (ENEMEEZ) enema 1 enema (1 enema Rectal Given 12/23/24 1332)   magnesium citrate solution 296 mL (296 mL Oral Given 12/23/24 1332)        ED Course:    ED Course as of 12/23/24 1453   Mon Dec 23, 2024   1452 Patient reports significant relief of her constipation symptoms, states she had a large bowel movement while in the ED. [RM]      ED Course User Index  [RM] Rachelle Mariano APRN       Labs:    Lab Results (last 24 hours)       ** No results found for the last 24  hours. **             Imaging:    XR Abdomen KUB    Result Date: 12/23/2024  XR ABDOMEN KUB Date of Exam: 12/23/2024 12:45 PM EST Indication: contipation Comparison: 7/14/2023 Findings: There is a moderate stool burden with a nonobstructing bowel gas pattern. No suspicious soft tissue mass or calcification is identified. There is no acute osseous abnormality.     Impression: Moderate stool burden with a nonobstructing bowel gas pattern Electronically Signed: Tera Latham MD  12/23/2024 12:57 PM EST  Workstation ID: [id number]       Differential Diagnosis and Discussion:    Abdominal Pain: Based on the patient's signs and symptoms, I considered abdominal aortic aneurysm, small bowel obstruction, pancreatitis, acute cholecystitis, acute appendecitis, peptic ulcer disease, gastritis, colitis, endocrine disorders, irritable bowel syndrome and other differential diagnosis an etiology of the patient's abdominal pain.    PROCEDURES:    X-ray were performed in the emergency department and all X-ray impressions were independently interpreted by me.    No orders to display       Procedures    MDM                     Patient Care Considerations:    CT ABDOMEN AND PELVIS: I considered ordering a CT scan of the abdomen and pelvis however no abdominal pain, no abdominal tenderness.      Consultants/Shared Management Plan:    None    Social Determinants of Health:    Patient is independent, reliable, and has access to care.       Disposition and Care Coordination:    Discharged: The patient is suitable and stable for discharge with no need for consideration of admission.    I have explained the patient´s condition, diagnoses and treatment plan based on the information available to me at this time. I have answered questions and addressed any concerns. The patient has a good  understanding of the patient´s diagnosis, condition, and treatment plan as can be expected at this point. The vital signs have been stable. The patient´s  condition is stable and appropriate for discharge from the emergency department.      The patient will pursue further outpatient evaluation with the primary care physician or other designated or consulting physician as outlined in the discharge instructions. They are agreeable to this plan of care and follow-up instructions have been explained in detail. The patient has received these instructions in written format and has expressed an understanding of the discharge instructions. The patient is aware that any significant change in condition or worsening of symptoms should prompt an immediate return to this or the closest emergency department or call to 911.  I have explained discharge medications and the need for follow up with the patient/caretakers. This was also printed in the discharge instructions. Patient was discharged with the following medications and follow up:      Medication List        New Prescriptions      bisacodyl 10 MG suppository  Commonly known as: DULCOLAX  Insert 1 suppository into the rectum Daily As Needed for Constipation.     magnesium citrate solution  Take 296 mL by mouth 1 (One) Time for 1 dose.               Where to Get Your Medications        These medications were sent to [id number] - [place], [place] - [address] AT [facility] - [phone]  - [phone] FX  [address] [facility] [address]      Phone: [phone]   bisacodyl 10 MG suppository  magnesium citrate solution      Yahaira Lauren APRN  [address] [facility] [address]  [facility] [address]  [phone]    Call in 2 days         Final diagnoses:   Constipation, unspecified constipation type        ED Disposition       ED Disposition   Discharge    Condition   Stable    Comment   --               This medical record created using voice recognition software.             Rachelle Mariano APRN  12/23/24 1876

## 2025-02-09 ENCOUNTER — HOSPITAL ENCOUNTER (EMERGENCY)
Facility: HOSPITAL | Age: 25
Discharge: HOME | End: 2025-02-09
Attending: EMERGENCY MEDICINE | Admitting: EMERGENCY MEDICINE
Payer: COMMERCIAL

## 2025-02-09 VITALS
RESPIRATION RATE: 14 BRPM | DIASTOLIC BLOOD PRESSURE: 105 MMHG | TEMPERATURE: 97.8 F | BODY MASS INDEX: 33.79 KG/M2 | WEIGHT: 183.64 LBS | OXYGEN SATURATION: 99 % | HEIGHT: 62 IN | SYSTOLIC BLOOD PRESSURE: 135 MMHG | HEART RATE: 93 BPM

## 2025-02-09 DIAGNOSIS — J10.1 INFLUENZA A: ICD-10-CM

## 2025-02-09 DIAGNOSIS — H66.002 NON-RECURRENT ACUTE SUPPURATIVE OTITIS MEDIA OF LEFT EAR WITHOUT SPONTANEOUS RUPTURE OF TYMPANIC MEMBRANE: Primary | ICD-10-CM

## 2025-02-09 PROCEDURE — 99283 EMERGENCY DEPT VISIT LOW MDM: CPT

## 2025-02-09 RX ORDER — IBUPROFEN 600 MG/1
600 TABLET, FILM COATED ORAL ONCE
Status: COMPLETED | OUTPATIENT
Start: 2025-02-09 | End: 2025-02-09

## 2025-02-09 RX ORDER — IBUPROFEN 600 MG/1
600 TABLET, FILM COATED ORAL EVERY 6 HOURS PRN
Qty: 30 TABLET | Refills: 0 | Status: SHIPPED | OUTPATIENT
Start: 2025-02-09

## 2025-02-09 RX ORDER — CEFDINIR 300 MG/1
300 CAPSULE ORAL 2 TIMES DAILY
Qty: 20 CAPSULE | Refills: 0 | Status: SHIPPED | OUTPATIENT
Start: 2025-02-09 | End: 2025-02-19

## 2025-02-09 RX ORDER — CEFDINIR 300 MG/1
300 CAPSULE ORAL ONCE
Status: COMPLETED | OUTPATIENT
Start: 2025-02-09 | End: 2025-02-09

## 2025-02-09 RX ADMIN — IBUPROFEN 600 MG: 600 TABLET, FILM COATED ORAL at 05:03

## 2025-02-09 RX ADMIN — CEFDINIR 300 MG: 300 CAPSULE ORAL at 05:23

## 2025-02-09 NOTE — DISCHARGE INSTRUCTIONS
It does appear that you have a left-sided otitis media.  Continue to drink plenty of fluids.  Take your meds as prescribed.  Complete the antibiotics.  You may use over-the-counter acetaminophen 975 mg every 4 hours with your medication as needed for pain and also warm compresses will help with comfort.  Follow-up with [facility] next week for reevaluation and further treatment as necessary.  Return to the emergency department immediately for any acutely development of swelling, any drainage from your ear, any airway difficulties or any new or worse concerns.

## 2025-02-09 NOTE — ED PROVIDER NOTES
Time: 3:50 AM EST  Date of encounter:  2/9/2025  Independent Historian/Clinical History and Information was obtained by:   Patient    History is limited by: N/A    Chief Complaint: LEFT EARACHE      History of Present Illness:    The patient is a 25 y.o. year old female who presents to the emergency department for evaluation of left ear pain.  Patient states that she was diagnosed with influenza a 2 days ago at the urgent care and has been running fevers and had congestion since then.  She states she never had any ear pain until she woke up tonight with left-sided ear pain.  She reports no drainage.  She states that she did not have many ear infections as a child and never had to have PE tubes.      Patient Care Team  Primary Care Provider: Yahaira Lauren APRN    Past Medical History:     No Known Allergies  Past Medical History:   Diagnosis Date    Hepatitis C 2022    Herpes     Herpes     Hyperlipidemia      No past surgical history on file.  No family history on file.    Home Medications:  Prior to Admission medications    Medication Sig Start Date End Date Taking? Authorizing Provider   acetaminophen (TYLENOL) 325 MG tablet Take 2 tablets by mouth Every 6 (Six) Hours As Needed for Mild Pain (Second Line: Mild pain unrelieved by ibuprofen. Stagger doses 3 hours after dose of ibuprofen.). 9/23/24   Odalis Lund MD   bisacodyl (DULCOLAX) 10 MG suppository Insert 1 suppository into the rectum Daily As Needed for Constipation. 12/23/24   Rachelle Mariano APRN   ferrous sulfate 325 (65 FE) MG tablet Take 1 tablet by mouth 3 (Three) Times a Week.    Provider, MD Manjit   FLUoxetine (PROzac) 20 MG capsule Take 1 capsule by mouth Daily. 9/23/24   Odalis Lund MD   Hydrocortisone, Perianal, (ANUSOL-HC) 2.5 % rectal cream Insert  into the rectum 4 (Four) Times a Day As Needed for Hemorrhoids. Indications: Inflamed Hemorrhoids, Itching of the Anus and/or Genital Area 9/23/24   Odalis Lund MD  "  ibuprofen (ADVIL,MOTRIN) 600 MG tablet Take 1 tablet by mouth Every 6 (Six) Hours As Needed for Moderate Pain (First Line: Mild pain.). 9/23/24   Odalis Lund MD   prenatal vitamin (prenatal, CLASSIC, vitamin) tablet Take  by mouth Daily.    Provider, MD Manjit   valACYclovir (VALTREX) 500 MG tablet Take 1 tablet by mouth As Needed. 3/24/23   Provider, MD Manjit        Social History:   Social History     Tobacco Use    Smoking status: Former     Current packs/day: 0.50     Types: Cigarettes     Passive exposure: Past    Smokeless tobacco: Never   Vaping Use    Vaping status: Every Day    Substances: Nicotine, Flavoring   Substance Use Topics    Alcohol use: Never    Drug use: Not Currently     Types: Methamphetamines, IV     Comment: former user last use 5/3/22         Review of Systems:  Review of Systems   Constitutional:  Positive for activity change, appetite change, chills, fatigue and fever.   HENT:  Positive for congestion, ear pain and sore throat. Negative for ear discharge, facial swelling, hearing loss, trouble swallowing and voice change.    Eyes:  Negative for pain.   Respiratory:  Positive for cough. Negative for chest tightness, shortness of breath and wheezing.    Cardiovascular:  Negative for chest pain.   Gastrointestinal:  Negative for abdominal pain, diarrhea, nausea and vomiting.   Genitourinary:  Negative for dysuria, flank pain, hematuria and urgency.   Musculoskeletal:  Negative for back pain, joint swelling, neck pain and neck stiffness.   Skin:  Negative for pallor and rash.   Neurological:  Positive for headaches. Negative for seizures.   All other systems reviewed and are negative.       Physical Exam:  BP (!) 135/105 (BP Location: Left arm, Patient Position: Lying)   Pulse 93   Temp 97.8 °F (36.6 °C)   Resp 14   Ht 157.5 cm (62.01\")   Wt 83.3 kg (183 lb 10.3 oz)   SpO2 99%   BMI 33.58 kg/m²     Physical Exam  Vitals and nursing note reviewed. "   Constitutional:       General: She is not in acute distress.     Appearance: Normal appearance. She is not ill-appearing or toxic-appearing.   HENT:      Head: Normocephalic and atraumatic.      Right Ear: Tympanic membrane, ear canal and external ear normal.      Left Ear: Ear canal and external ear normal. Tenderness present. Tympanic membrane is erythematous.      Mouth/Throat:      Mouth: Mucous membranes are moist.      Pharynx: Oropharynx is clear. No oropharyngeal exudate or posterior oropharyngeal erythema.   Eyes:      General: No scleral icterus.     Conjunctiva/sclera: Conjunctivae normal.      Pupils: Pupils are equal, round, and reactive to light.   Cardiovascular:      Rate and Rhythm: Normal rate and regular rhythm.      Pulses: Normal pulses.      Heart sounds: Normal heart sounds.   Pulmonary:      Effort: Pulmonary effort is normal. No respiratory distress.      Breath sounds: Normal breath sounds.   Abdominal:      General: Abdomen is flat.      Palpations: Abdomen is soft.      Tenderness: There is no abdominal tenderness.   Musculoskeletal:         General: Normal range of motion.      Cervical back: Normal range of motion and neck supple.   Skin:     General: Skin is warm and dry.   Neurological:      Mental Status: She is alert and oriented to person, place, and time. Mental status is at baseline.          Medical Decision Making:      Comorbidities that affect care:    Herpes, hyperlipidemia, hep C    External Notes reviewed:    Previous Clinic Note: Patient was seen in the [facility] urgent care 2 days ago and diagnosed with influenza A.      The following orders were placed and all results were independently analyzed by me:  No orders of the defined types were placed in this encounter.      Medications Given in the Emergency Department:  Medications   ibuprofen (ADVIL,MOTRIN) tablet 600 mg (600 mg Oral Given 2/9/25 4855)   cefdinir (OMNICEF) capsule 300 mg (300 mg Oral Given 2/9/25 8589)         ED Course:         Labs:    Lab Results (last 24 hours)       ** No results found for the last 24 hours. **             Imaging:    No Radiology Exams Resulted Within Past 24 Hours      Differential Diagnosis and Discussion:    Ear Pain: Differential diagnosis includes but is not limited to this externa, otitis media, foreign body, bullous myringitis, furuncles, herpes zoster, mastoiditis, trauma, and tumors    PROCEDURES:      No orders to display       Procedures    MDM       Patient Care Considerations:    LABS: I considered ordering labs, however considering the patient stable condition and complaint I did not feel it was necessary at this time.      Consultants/Shared Management Plan:    None    Social Determinants of Health:    Patient is independent, reliable, and has access to care.       Disposition and Care Coordination:    Discharged: The patient is suitable and stable for discharge with no need for consideration of admission.    I have explained the patient´s condition, diagnoses and treatment plan based on the information available to me at this time. I have answered questions and addressed any concerns. The patient has a good  understanding of the patient´s diagnosis, condition, and treatment plan as can be expected at this point. The vital signs have been stable. The patient´s condition is stable and appropriate for discharge from the emergency department.      The patient will pursue further outpatient evaluation with the primary care physician or other designated or consulting physician as outlined in the discharge instructions. They are agreeable to this plan of care and follow-up instructions have been explained in detail. The patient has received these instructions in written format and has expressed an understanding of the discharge instructions. The patient is aware that any significant change in condition or worsening of symptoms should prompt an immediate return to this or the closest  emergency department or call to 911.  I have explained discharge medications and the need for follow up with the patient/caretakers. This was also printed in the discharge instructions. Patient was discharged with the following medications and follow up:      Medication List        New Prescriptions      cefdinir 300 MG capsule  Commonly known as: OMNICEF  Take 1 capsule by mouth 2 (Two) Times a Day for 10 days.            Changed      ibuprofen 600 MG tablet  Commonly known as: ADVIL,MOTRIN  Take 1 tablet by mouth Every 6 (Six) Hours As Needed for Moderate Pain.  What changed: reasons to take this               Where to Get Your Medications        These medications were sent to [facility]/pharmacy #[id number] - [place], [place] - [address] - [phone]  - [phone] FX  [address] [place] [address]      Hours: 24-hours Phone: [phone]   cefdinir 300 MG capsule  ibuprofen 600 MG tablet      Yahaira Lauren, APRN  [address]  [address]  [place] [address]  [phone]    Call   FOR FOLLOW UP       Final diagnoses:   Influenza A   Non-recurrent acute suppurative otitis media of left ear without spontaneous rupture of tympanic membrane        ED Disposition       ED Disposition   Discharge    Condition   Stable    Comment   --               This medical record created using voice recognition software.             Anamaria Trevino APRN  02/09/25 2070

## 2025-04-13 ENCOUNTER — APPOINTMENT (OUTPATIENT)
Dept: GENERAL RADIOLOGY | Facility: HOSPITAL | Age: 25
End: 2025-04-13
Payer: COMMERCIAL

## 2025-04-13 ENCOUNTER — HOSPITAL ENCOUNTER (EMERGENCY)
Facility: HOSPITAL | Age: 25
Discharge: HOME | End: 2025-04-13
Attending: EMERGENCY MEDICINE | Admitting: EMERGENCY MEDICINE
Payer: COMMERCIAL

## 2025-04-13 VITALS
BODY MASS INDEX: 32.74 KG/M2 | RESPIRATION RATE: 16 BRPM | HEART RATE: 101 BPM | WEIGHT: 177.91 LBS | DIASTOLIC BLOOD PRESSURE: 87 MMHG | HEIGHT: 62 IN | TEMPERATURE: 98.7 F | OXYGEN SATURATION: 93 % | SYSTOLIC BLOOD PRESSURE: 132 MMHG

## 2025-04-13 DIAGNOSIS — K59.00 CONSTIPATION, UNSPECIFIED CONSTIPATION TYPE: Primary | ICD-10-CM

## 2025-04-13 PROCEDURE — 99284 EMERGENCY DEPT VISIT MOD MDM: CPT

## 2025-04-13 PROCEDURE — 74018 RADEX ABDOMEN 1 VIEW: CPT

## 2025-04-13 PROCEDURE — 36415 COLL VENOUS BLD VENIPUNCTURE: CPT

## 2025-04-13 RX ORDER — BISACODYL 10 MG
10 SUPPOSITORY, RECTAL RECTAL DAILY
Qty: 12 EACH | Refills: 0 | Status: SHIPPED | OUTPATIENT
Start: 2025-04-13

## 2025-04-13 RX ORDER — DOCUSATE SODIUM 100 MG/1
100 CAPSULE, LIQUID FILLED ORAL 2 TIMES DAILY PRN
Qty: 30 CAPSULE | Refills: 0 | Status: SHIPPED | OUTPATIENT
Start: 2025-04-13

## 2025-04-13 RX ORDER — ESCITALOPRAM OXALATE 10 MG/1
10 TABLET ORAL DAILY
COMMUNITY
Start: 2025-04-07

## 2025-04-13 RX ORDER — MAGNESIUM CARB/ALUMINUM HYDROX 105-160MG
296 TABLET,CHEWABLE ORAL ONCE
Status: COMPLETED | OUTPATIENT
Start: 2025-04-13 | End: 2025-04-13

## 2025-04-13 RX ADMIN — DOCUSATE SODIUM 1 ENEMA: 283 LIQUID RECTAL at 17:46

## 2025-04-13 RX ADMIN — MAJOR MAGNESIUM CITRATE ORAL SOLUTION - LEMON 296 ML: 1.75 LIQUID ORAL at 17:46

## 2025-04-13 NOTE — ED PROVIDER NOTES
Time: 4:24 PM EDT  Date of encounter:  4/13/2025  Independent Historian/Clinical History and Information was obtained by:   Patient    History is limited by: N/A    Chief Complaint: Constipation      History of Present Illness:  Patient is a 25 y.o. year old female who presents to the emergency department for evaluation of constipation.  Patient reports last bowel movement was 1 week ago.  Patient reports history of chronic constipation.  Patient reports she has tried taking MiraLAX with no relief.  Patient reports she also has a hemorrhoid that is sometimes painful.  Patient reports discomfort with sitting, states it feels like hard stool near her rectum that she is having difficulty passing.  Patient denies nausea or vomiting, reports general abdominal discomfort and fullness sensation.      Patient Care Team  Primary Care Provider: Yahaira Lauren APRN    Past Medical History:     No Known Allergies  Past Medical History:   Diagnosis Date    Hepatitis C 2022    Herpes     Herpes     Hyperlipidemia      History reviewed. No pertinent surgical history.  History reviewed. No pertinent family history.    Home Medications:  Prior to Admission medications    Medication Sig Start Date End Date Taking? Authorizing Provider   acetaminophen (TYLENOL) 325 MG tablet Take 2 tablets by mouth Every 6 (Six) Hours As Needed for Mild Pain (Second Line: Mild pain unrelieved by ibuprofen. Stagger doses 3 hours after dose of ibuprofen.). 9/23/24   Odalis Lund MD   bisacodyl (DULCOLAX) 10 MG suppository Insert 1 suppository into the rectum Daily As Needed for Constipation. 12/23/24   Rachelle Mariano APRN   ferrous sulfate 325 (65 FE) MG tablet Take 1 tablet by mouth 3 (Three) Times a Week.    Provider, MD Manjit   FLUoxetine (PROzac) 20 MG capsule Take 1 capsule by mouth Daily. 9/23/24   Odalis Lund MD   Hydrocortisone, Perianal, (ANUSOL-HC) 2.5 % rectal cream Insert  into the rectum 4 (Four) Times a Day As  "Needed for Hemorrhoids. Indications: Inflamed Hemorrhoids, Itching of the Anus and/or Genital Area 9/23/24   Odalis Lund MD   ibuprofen (ADVIL,MOTRIN) 600 MG tablet Take 1 tablet by mouth Every 6 (Six) Hours As Needed for Moderate Pain. 2/9/25   Anamaria Trevino APRN   prenatal vitamin (prenatal, CLASSIC, vitamin) tablet Take  by mouth Daily.    ProviderManjit MD   valACYclovir (VALTREX) 500 MG tablet Take 1 tablet by mouth As Needed. 3/24/23   Provider, MD Manjit        Social History:   Social History     Tobacco Use    Smoking status: Former     Current packs/day: 0.50     Types: Cigarettes     Passive exposure: Past    Smokeless tobacco: Never   Vaping Use    Vaping status: Every Day    Substances: Nicotine, Flavoring   Substance Use Topics    Alcohol use: Never    Drug use: Not Currently     Types: Methamphetamines, IV     Comment: former user last use 5/3/22         Review of Systems:  Review of Systems   Constitutional:  Negative for fever.   HENT:  Negative for sore throat.    Eyes: Negative.    Respiratory:  Negative for cough and shortness of breath.    Cardiovascular:  Negative for chest pain.   Gastrointestinal:  Positive for constipation and rectal pain. Negative for abdominal pain, diarrhea and vomiting.   Genitourinary:  Negative for dysuria.   Musculoskeletal:  Negative for neck pain.   Skin:  Negative for rash.   Allergic/Immunologic: Negative.    Neurological:  Negative for weakness, numbness and headaches.   Hematological: Negative.    Psychiatric/Behavioral: Negative.     All other systems reviewed and are negative.       Physical Exam:  /87   Pulse 101   Temp 98.7 °F (37.1 °C) (Oral)   Resp 16   Ht 157.5 cm (62\")   Wt 80.7 kg (177 lb 14.6 oz)   LMP 04/10/2025   SpO2 93%   Breastfeeding No   BMI 32.54 kg/m²     Physical Exam  Vitals and nursing note reviewed. Exam conducted with a chaperone present (Frederick Sotomayor RN).   Constitutional:       Appearance: Normal " appearance. She is not ill-appearing or toxic-appearing.   HENT:      Head: Normocephalic.      Nose: Nose normal.   Eyes:      Extraocular Movements: Extraocular movements intact.      Conjunctiva/sclera: Conjunctivae normal.      Pupils: Pupils are equal, round, and reactive to light.   Cardiovascular:      Rate and Rhythm: Normal rate.      Pulses: Normal pulses.   Pulmonary:      Effort: Pulmonary effort is normal.   Abdominal:      General: Abdomen is flat. There is no distension.      Palpations: Abdomen is soft.   Genitourinary:     Rectum: Internal hemorrhoid present. Normal anal tone.      Comments: Small hard formed stool noted on exam, which was digitally disimpacted by me  Musculoskeletal:      Cervical back: Normal range of motion and neck supple.   Skin:     General: Skin is warm and dry.      Capillary Refill: Capillary refill takes less than 2 seconds.   Neurological:      General: No focal deficit present.      Mental Status: She is alert and oriented to person, place, and time.   Psychiatric:         Mood and Affect: Mood normal.            Medical Decision Making:      Comorbidities that affect care:    Smoking    External Notes reviewed:    Previous Clinic Note: Previous urgent care office visit from 2/7/2025      The following orders were placed and all results were independently analyzed by me:  Orders Placed This Encounter   Procedures    XR Abdomen KUB    Soap suds enema       Medications Given in the Emergency Department:  Medications   docusate sodium (ENEMEEZ) enema 1 enema (1 enema Rectal Given 4/13/25 1746)   magnesium citrate solution 296 mL (296 mL Oral Given 4/13/25 1746)        ED Course:         Labs:    Lab Results (last 24 hours)       ** No results found for the last 24 hours. **             Imaging:    XR Abdomen KUB  Result Date: 4/13/2025  XR ABDOMEN KUB Date of Exam: 4/13/2025 4:56 PM EDT Indication: constipation Comparison: KUB 12/23/2024 Findings: The stomach is not  abnormally distended. No abnormality is seen at the lung bases. The bowel gas pattern is unremarkable. Moderate to large amount of stool is seen in the colon. There are no findings of obstruction or impaction. No unusual mass or calcification is seen. Bony structures appear intact.     Impression: KUB demonstrating an unremarkable bowel gas pattern. Moderate to large stool burden. Electronically Signed: Quentin Weiner MD  4/13/2025 5:13 PM EDT  Workstation ID: [id number]        Differential Diagnosis and Discussion:    Abdominal Pain: Based on the patient's signs and symptoms, I considered abdominal aortic aneurysm, small bowel obstruction, pancreatitis, acute cholecystitis, acute appendecitis, peptic ulcer disease, gastritis, colitis, endocrine disorders, irritable bowel syndrome and other differential diagnosis an etiology of the patient's abdominal pain.    PROCEDURES:    X-ray were performed in the emergency department and all X-ray impressions were independently interpreted by me.    No orders to display       Procedures    MDM     Patient presented to the ED with complaints of constipation for the past week, no relief with home use of MiraLAX.  Patient is very anxious upon exam, worried that her retained stool will be a risk to her life.  2 enemas were attempted in the ED today with no results.  Patient was digitally disimpacted by myself.  She was able to have a large bowel movement after the disimpaction.  Patient states she is feeling much better and is ready to be discharged home.                Patient Care Considerations:    CT ABDOMEN AND PELVIS: I considered ordering a CT scan of the abdomen and pelvis however no abdominal tenderness, no vomiting.      Consultants/Shared Management Plan:    None    Social Determinants of Health:    Patient is independent, reliable, and has access to care.       Disposition and Care Coordination:    Discharged: The patient is suitable and stable for discharge with no need  for consideration of admission.    I have explained the patient´s condition, diagnoses and treatment plan based on the information available to me at this time. I have answered questions and addressed any concerns. The patient has a good  understanding of the patient´s diagnosis, condition, and treatment plan as can be expected at this point. The vital signs have been stable. The patient´s condition is stable and appropriate for discharge from the emergency department.      The patient will pursue further outpatient evaluation with the primary care physician or other designated or consulting physician as outlined in the discharge instructions. They are agreeable to this plan of care and follow-up instructions have been explained in detail. The patient has received these instructions in written format and has expressed an understanding of the discharge instructions. The patient is aware that any significant change in condition or worsening of symptoms should prompt an immediate return to this or the closest emergency department or call to 911.  I have explained discharge medications and the need for follow up with the patient/caretakers. This was also printed in the discharge instructions. Patient was discharged with the following medications and follow up:      Medication List        New Prescriptions      docusate sodium 100 MG capsule  Commonly known as: COLACE  Take 1 capsule by mouth 2 (Two) Times a Day As Needed for Constipation.            Changed      bisacodyl 10 MG suppository  Commonly known as: DULCOLAX  Insert 1 suppository into the rectum Daily.  What changed:   when to take this  reasons to take this               Where to Get Your Medications        These medications were sent to [facility]/pharmacy #[id number] - [place], [place] - [address] - [phone] [facility] [phone] FX  [address] [place] [address]      Hours: 24-hours Phone: [phone]   bisacodyl 10 MG suppository  docusate sodium  100 MG capsule      Yahaira Lauren, APRN  [address]  [address]  [place] [address]  [phone]    Call in 2 days         Final diagnoses:   Constipation, unspecified constipation type        ED Disposition       ED Disposition   Discharge    Condition   Stable    Comment   --               This medical record created using voice recognition software.             Rachelle Mariano, APRN  04/13/25 2024

## 2025-04-14 NOTE — DISCHARGE INSTRUCTIONS
Increase the amount of fiber and water in your diet.  Should also increase your physical activity by going for a walk daily, which can help stimulate movement of the bowels.  Start taking the Colace twice a day as needed for constipation.  This is a stool softener that should make it easier to pass the stool.  You may also try using the suppository as needed.  Follow-up with your primary care provider.

## 2025-04-30 ENCOUNTER — HOSPITAL ENCOUNTER (EMERGENCY)
Facility: HOSPITAL | Age: 25
Discharge: HOME | End: 2025-05-01
Attending: EMERGENCY MEDICINE
Payer: COMMERCIAL

## 2025-04-30 DIAGNOSIS — K52.9 GASTROENTERITIS: Primary | ICD-10-CM

## 2025-04-30 LAB
ALBUMIN SERPL-MCNC: 4.9 G/DL (ref 3.5–5.2)
ALBUMIN/GLOB SERPL: 1.3 G/DL
ALP SERPL-CCNC: 85 U/L (ref 39–117)
ALT SERPL W P-5'-P-CCNC: 44 U/L (ref 1–33)
ANION GAP SERPL CALCULATED.3IONS-SCNC: 15.2 MMOL/L (ref 5–15)
AST SERPL-CCNC: 39 U/L (ref 1–32)
BASOPHILS # BLD AUTO: 0.05 10*3/MM3 (ref 0–0.2)
BASOPHILS NFR BLD AUTO: 0.4 % (ref 0–1.5)
BILIRUB SERPL-MCNC: 0.2 MG/DL (ref 0–1.2)
BUN SERPL-MCNC: 9 MG/DL (ref 6–20)
BUN/CREAT SERPL: 11.4 (ref 7–25)
CALCIUM SPEC-SCNC: 10.3 MG/DL (ref 8.6–10.5)
CHLORIDE SERPL-SCNC: 99 MMOL/L (ref 98–107)
CO2 SERPL-SCNC: 22.8 MMOL/L (ref 22–29)
CREAT SERPL-MCNC: 0.79 MG/DL (ref 0.57–1)
DEPRECATED RDW RBC AUTO: 41 FL (ref 37–54)
EGFRCR SERPLBLD CKD-EPI 2021: 107.3 ML/MIN/1.73
EOSINOPHIL # BLD AUTO: 0.25 10*3/MM3 (ref 0–0.4)
EOSINOPHIL NFR BLD AUTO: 2.2 % (ref 0.3–6.2)
ERYTHROCYTE [DISTWIDTH] IN BLOOD BY AUTOMATED COUNT: 13.5 % (ref 12.3–15.4)
GLOBULIN UR ELPH-MCNC: 3.7 GM/DL
GLUCOSE SERPL-MCNC: 100 MG/DL (ref 65–99)
HCG INTACT+B SERPL-ACNC: <0.5 MIU/ML
HCT VFR BLD AUTO: 44.5 % (ref 34–46.6)
HGB BLD-MCNC: 14.9 G/DL (ref 12–15.9)
IMM GRANULOCYTES # BLD AUTO: 0.03 10*3/MM3 (ref 0–0.05)
IMM GRANULOCYTES NFR BLD AUTO: 0.3 % (ref 0–0.5)
LIPASE SERPL-CCNC: 83 U/L (ref 13–60)
LYMPHOCYTES # BLD AUTO: 5.75 10*3/MM3 (ref 0.7–3.1)
LYMPHOCYTES NFR BLD AUTO: 50.3 % (ref 19.6–45.3)
MCH RBC QN AUTO: 27.9 PG (ref 26.6–33)
MCHC RBC AUTO-ENTMCNC: 33.5 G/DL (ref 31.5–35.7)
MCV RBC AUTO: 83.2 FL (ref 79–97)
MONOCYTES # BLD AUTO: 0.8 10*3/MM3 (ref 0.1–0.9)
MONOCYTES NFR BLD AUTO: 7 % (ref 5–12)
NEUTROPHILS NFR BLD AUTO: 39.8 % (ref 42.7–76)
NEUTROPHILS NFR BLD AUTO: 4.56 10*3/MM3 (ref 1.7–7)
NRBC BLD AUTO-RTO: 0 /100 WBC (ref 0–0.2)
PLAT MORPH BLD: NORMAL
PLATELET # BLD AUTO: 289 10*3/MM3 (ref 140–450)
PMV BLD AUTO: 11.1 FL (ref 6–12)
POTASSIUM SERPL-SCNC: 2.6 MMOL/L (ref 3.5–5.2)
PROT SERPL-MCNC: 8.6 G/DL (ref 6–8.5)
RBC # BLD AUTO: 5.35 10*6/MM3 (ref 3.77–5.28)
RBC MORPH BLD: NORMAL
SODIUM SERPL-SCNC: 137 MMOL/L (ref 136–145)
WBC MORPH BLD: NORMAL
WBC NRBC COR # BLD AUTO: 11.44 10*3/MM3 (ref 3.4–10.8)
WHOLE BLOOD HOLD COAG: NORMAL

## 2025-04-30 PROCEDURE — 25810000003 SODIUM CHLORIDE 0.9 % SOLUTION: Performed by: EMERGENCY MEDICINE

## 2025-04-30 PROCEDURE — 83690 ASSAY OF LIPASE: CPT | Performed by: EMERGENCY MEDICINE

## 2025-04-30 PROCEDURE — 25010000002 ONDANSETRON PER 1 MG: Performed by: EMERGENCY MEDICINE

## 2025-04-30 PROCEDURE — 80179 DRUG ASSAY SALICYLATE: CPT | Performed by: EMERGENCY MEDICINE

## 2025-04-30 PROCEDURE — 84702 CHORIONIC GONADOTROPIN TEST: CPT | Performed by: EMERGENCY MEDICINE

## 2025-04-30 PROCEDURE — 80053 COMPREHEN METABOLIC PANEL: CPT | Performed by: EMERGENCY MEDICINE

## 2025-04-30 PROCEDURE — 80143 DRUG ASSAY ACETAMINOPHEN: CPT | Performed by: EMERGENCY MEDICINE

## 2025-04-30 PROCEDURE — 83735 ASSAY OF MAGNESIUM: CPT | Performed by: EMERGENCY MEDICINE

## 2025-04-30 PROCEDURE — 85007 BL SMEAR W/DIFF WBC COUNT: CPT | Performed by: EMERGENCY MEDICINE

## 2025-04-30 PROCEDURE — 85025 COMPLETE CBC W/AUTO DIFF WBC: CPT | Performed by: EMERGENCY MEDICINE

## 2025-04-30 PROCEDURE — 25010000002 DICYCLOMINE PER 20 MG: Performed by: EMERGENCY MEDICINE

## 2025-04-30 PROCEDURE — 96375 TX/PRO/DX INJ NEW DRUG ADDON: CPT

## 2025-04-30 PROCEDURE — 96372 THER/PROPH/DIAG INJ SC/IM: CPT

## 2025-04-30 PROCEDURE — 82077 ASSAY SPEC XCP UR&BREATH IA: CPT | Performed by: EMERGENCY MEDICINE

## 2025-04-30 PROCEDURE — 99283 EMERGENCY DEPT VISIT LOW MDM: CPT

## 2025-04-30 RX ORDER — DICYCLOMINE HYDROCHLORIDE 10 MG/ML
20 INJECTION INTRAMUSCULAR ONCE
Status: COMPLETED | OUTPATIENT
Start: 2025-04-30 | End: 2025-04-30

## 2025-04-30 RX ORDER — POTASSIUM CHLORIDE 7.45 MG/ML
10 INJECTION INTRAVENOUS ONCE
Status: COMPLETED | OUTPATIENT
Start: 2025-05-01 | End: 2025-05-01

## 2025-04-30 RX ORDER — ONDANSETRON 2 MG/ML
4 INJECTION INTRAMUSCULAR; INTRAVENOUS ONCE
Status: COMPLETED | OUTPATIENT
Start: 2025-04-30 | End: 2025-04-30

## 2025-04-30 RX ADMIN — DICYCLOMINE HYDROCHLORIDE 20 MG: 10 INJECTION, SOLUTION INTRAMUSCULAR at 23:18

## 2025-04-30 RX ADMIN — ONDANSETRON 4 MG: 2 INJECTION INTRAMUSCULAR; INTRAVENOUS at 23:42

## 2025-04-30 RX ADMIN — SODIUM CHLORIDE 1000 ML: 9 INJECTION, SOLUTION INTRAVENOUS at 23:21

## 2025-05-01 VITALS
HEIGHT: 62 IN | BODY MASS INDEX: 34.97 KG/M2 | DIASTOLIC BLOOD PRESSURE: 80 MMHG | WEIGHT: 190.04 LBS | SYSTOLIC BLOOD PRESSURE: 118 MMHG | HEART RATE: 90 BPM | TEMPERATURE: 97.6 F | OXYGEN SATURATION: 98 % | RESPIRATION RATE: 19 BRPM

## 2025-05-01 LAB
APAP SERPL-MCNC: <5 MCG/ML (ref 0–30)
ATMOSPHERIC PRESS: 739.3 MMHG
BASE EXCESS BLDV CALC-SCNC: -1.5 MMOL/L (ref -2–2)
BUN BLDA-MCNC: 8 MG/DL (ref 8–23)
CA-I BLDA-SCNC: 1.1 MMOL/L (ref 1.13–1.32)
CHLORIDE BLDA-SCNC: 109 MMOL/L (ref 98–107)
CO2 BLDA-SCNC: 20.7 MMOL/L (ref 23–27)
CREAT BLDA-MCNC: 0.45 MG/DL (ref 0.6–1.3)
D-LACTATE SERPL-SCNC: 1.3 MMOL/L
ETHANOL BLD-MCNC: <10 MG/DL (ref 0–10)
ETHANOL UR QL: <0.01 %
GLUCOSE BLDC GLUCOMTR-MCNC: 135 MG/DL (ref 65–99)
HCO3 BLDV-SCNC: 21.8 MMOL/L (ref 22–26)
HGB BLDA-MCNC: 14.2 G/DL (ref 12–18)
MAGNESIUM SERPL-MCNC: 1.9 MG/DL (ref 1.6–2.6)
MODALITY: ABNORMAL
PCO2 BLDV: 32 MM HG (ref 41–51)
PH BLDV: 7.44 PH UNITS (ref 7.31–7.41)
PO2 BLDV: 91.8 MM HG (ref 35–42)
POTASSIUM BLDA-SCNC: 3.3 MMOL/L (ref 3.5–5)
SALICYLATES SERPL-MCNC: <0.3 MG/DL
SAO2 % BLDCOV: 97.5 % (ref 45–75)
SODIUM BLD-SCNC: 142 MMOL/L (ref 131–143)

## 2025-05-01 PROCEDURE — 80047 BASIC METABLC PNL IONIZED CA: CPT

## 2025-05-01 PROCEDURE — 82803 BLOOD GASES ANY COMBINATION: CPT

## 2025-05-01 PROCEDURE — 96366 THER/PROPH/DIAG IV INF ADDON: CPT

## 2025-05-01 PROCEDURE — 25010000002 POTASSIUM CHLORIDE 10 MEQ/100ML SOLUTION: Performed by: EMERGENCY MEDICINE

## 2025-05-01 PROCEDURE — 96365 THER/PROPH/DIAG IV INF INIT: CPT

## 2025-05-01 PROCEDURE — 83605 ASSAY OF LACTIC ACID: CPT

## 2025-05-01 RX ORDER — DICYCLOMINE HCL 20 MG
20 TABLET ORAL EVERY 6 HOURS
Qty: 20 TABLET | Refills: 0 | Status: SHIPPED | OUTPATIENT
Start: 2025-05-01

## 2025-05-01 RX ORDER — ONDANSETRON 4 MG/1
4 TABLET, ORALLY DISINTEGRATING ORAL EVERY 8 HOURS PRN
Qty: 10 TABLET | Refills: 0 | Status: SHIPPED | OUTPATIENT
Start: 2025-05-01

## 2025-05-01 RX ADMIN — POTASSIUM CHLORIDE 10 MEQ: 7.46 INJECTION, SOLUTION INTRAVENOUS at 01:47

## 2025-05-01 RX ADMIN — POTASSIUM CHLORIDE 10 MEQ: 7.46 INJECTION, SOLUTION INTRAVENOUS at 00:24

## 2025-05-01 NOTE — ED NOTES
Per spouse pt felt hot,tingling her stomach was tight and felt she had a reaction to a recent medication (Lybalvi). Pt has been having uncontrolled diarrhea but denies nausea or abdominal pain. Pt took her 1st dose aprox [id number]

## 2025-05-01 NOTE — ED PROVIDER NOTES
Time: 11:42 PM EDT  Date of encounter:  4/30/2025  Independent Historian/Clinical History and Information was obtained by:   Patient    History is limited by: N/A    Chief Complaint: diaphoresis, abdominal cramps, diarrhea      History of Present Illness:  Patient is a 25 y.o. year old female who presents to the emergency department for evaluation of Abdominal cramps, diaphoresis and diarrhea.  Patient states she took 1 Lybalvi about a hour prior to onset of symptoms.  She denies any new foods.  No known sick contacts.  No chest pain or shortness of breath.  No fever.      Patient Care Team  Primary Care Provider: Yahaira Lauren APRN    Past Medical History:     No Known Allergies  Past Medical History:   Diagnosis Date    Depression     Hepatitis C 2022    Herpes     Herpes     Hyperlipidemia      History reviewed. No pertinent surgical history.  History reviewed. No pertinent family history.    Home Medications:  Prior to Admission medications    Medication Sig Start Date End Date Taking? Authorizing Provider   acetaminophen (TYLENOL) 325 MG tablet Take 2 tablets by mouth Every 6 (Six) Hours As Needed for Mild Pain (Second Line: Mild pain unrelieved by ibuprofen. Stagger doses 3 hours after dose of ibuprofen.). 9/23/24   Odalis Lund MD   bisacodyl (DULCOLAX) 10 MG suppository Insert 1 suppository into the rectum Daily. 4/13/25   Rachelle Mariano APRN   docusate sodium (COLACE) 100 MG capsule Take 1 capsule by mouth 2 (Two) Times a Day As Needed for Constipation. 4/13/25   Rachelle Mariano APRN   escitalopram (LEXAPRO) 10 MG tablet Take 1 tablet by mouth Daily. 4/7/25   ProviderManjit MD   ferrous sulfate 325 (65 FE) MG tablet Take 1 tablet by mouth 3 (Three) Times a Week.    Manjit Anders MD   FLUoxetine (PROzac) 20 MG capsule Take 1 capsule by mouth Daily. 9/23/24   Odalis Lund MD   Hydrocortisone, Perianal, (ANUSOL-HC) 2.5 % rectal cream Insert  into the rectum 4 (Four) Times a  "Day As Needed for Hemorrhoids. Indications: Inflamed Hemorrhoids, Itching of the Anus and/or Genital Area 9/23/24   Odalis Lund MD   ibuprofen (ADVIL,MOTRIN) 600 MG tablet Take 1 tablet by mouth Every 6 (Six) Hours As Needed for Moderate Pain. 2/9/25   Anamaria Trevino APRN   prenatal vitamin (prenatal, CLASSIC, vitamin) tablet Take  by mouth Daily.    ProviderManjit MD   valACYclovir (VALTREX) 500 MG tablet Take 1 tablet by mouth As Needed. 3/24/23   Provider, MD Manjit        Social History:   Social History     Tobacco Use    Smoking status: Every Day     Current packs/day: 0.50     Types: Cigarettes     Passive exposure: Past    Smokeless tobacco: Never   Vaping Use    Vaping status: Every Day    Substances: Nicotine, Flavoring   Substance Use Topics    Alcohol use: Never    Drug use: Not Currently     Types: Methamphetamines, IV     Comment: former user last use 5/3/22         Review of Systems:  Review of Systems   Constitutional:  Positive for diaphoresis. Negative for chills and fever.   HENT:  Negative for congestion, ear pain and sore throat.    Eyes:  Negative for pain.   Respiratory:  Negative for cough, chest tightness and shortness of breath.    Cardiovascular:  Negative for chest pain.   Gastrointestinal:  Positive for abdominal pain and diarrhea. Negative for nausea and vomiting.   Genitourinary:  Negative for flank pain and hematuria.   Musculoskeletal:  Negative for joint swelling.   Skin:  Negative for pallor.   Neurological:  Negative for seizures and headaches.   All other systems reviewed and are negative.       Physical Exam:  /84 (BP Location: Left arm, Patient Position: Lying)   Pulse 89   Temp 97.8 °F (36.6 °C) (Oral)   Resp 20   Ht 157.5 cm (62.01\")   Wt 86.2 kg (190 lb 0.6 oz)   LMP 04/10/2025 (Exact Date)   SpO2 98%   Breastfeeding No   BMI 34.75 kg/m²     Physical Exam  Constitutional:       Appearance: Normal appearance. She is diaphoretic.   HENT: "      Head: Normocephalic and atraumatic.      Nose: Nose normal.      Mouth/Throat:      Mouth: Mucous membranes are moist.   Eyes:      Extraocular Movements: Extraocular movements intact.      Conjunctiva/sclera: Conjunctivae normal.      Pupils: Pupils are equal, round, and reactive to light.   Cardiovascular:      Rate and Rhythm: Normal rate and regular rhythm.      Pulses: Normal pulses.      Heart sounds: Normal heart sounds.   Pulmonary:      Effort: Pulmonary effort is normal.      Breath sounds: Normal breath sounds.   Abdominal:      General: There is no distension.      Palpations: Abdomen is soft.      Tenderness: There is abdominal tenderness.   Musculoskeletal:         General: Normal range of motion.      Cervical back: Normal range of motion.   Skin:     General: Skin is warm and dry.      Capillary Refill: Capillary refill takes less than 2 seconds.      Coloration: Skin is pale.   Neurological:      General: No focal deficit present.      Mental Status: She is alert and oriented to person, place, and time. Mental status is at baseline.   Psychiatric:         Mood and Affect: Mood normal.         Behavior: Behavior normal.                    Medical Decision Making:      Comorbidities that affect care:    HLD, Hep C, Herpes    External Notes reviewed:    Previous Clinic Note: Patient was seen on 4/13/25 for constipation.      The following orders were placed and all results were independently analyzed by me:  Orders Placed This Encounter   Procedures    Enteric Bacterial Panel - Stool, Per Rectum    Comprehensive Metabolic Panel    Lipase    Urinalysis With Culture If Indicated - Urine, Clean Catch    hCG, Quantitative, Pregnancy    CBC Auto Differential    Fecal Lactoferrin Qual. - Stool, Per Rectum    Scan Slide    Urine Drug Screen - Urine, Clean Catch    Ethanol    Salicylate Level    Acetaminophen Level    Magnesium    Venous Blood Gas,H&H,Lytes,Lactate    Blood Gas, Venous -    POC Chem Panel     POC Lactate    CBC & Differential    Extra Tubes    Light Blue Top       Medications Given in the Emergency Department:  Medications   sodium chloride 0.9 % bolus 1,000 mL (0 mL Intravenous Stopped 5/1/25 0148)   ondansetron (ZOFRAN) injection 4 mg (4 mg Intravenous Given 4/30/25 2342)   dicyclomine (BENTYL) injection 20 mg (20 mg Intramuscular Given 4/30/25 2318)   potassium chloride 10 mEq in 100 mL IVPB (0 mEq Intravenous Stopped 5/1/25 0504)   potassium chloride 10 mEq in 100 mL IVPB (0 mEq Intravenous Stopped 5/1/25 0148)        ED Course:         Labs:    Lab Results (last 24 hours)       Procedure Component Value Units Date/Time    CBC & Differential [[id number]]  (Abnormal) Collected: 04/30/25 2248    Specimen: Blood Updated: 04/30/25 2328    Narrative:      The following orders were created for panel order CBC & Differential.  Procedure                               Abnormality         Status                     ---------                               -----------         ------                     CBC Auto Differential[[id number]]        Abnormal            Final result               Scan Slide[[id number]]                   Normal              Final result                 Please view results for these tests on the individual orders.    Comprehensive Metabolic Panel [[id number]]  (Abnormal) Collected: 04/30/25 2248    Specimen: Blood Updated: 04/30/25 2342     Glucose 100 mg/dL      BUN 9 mg/dL      Creatinine 0.79 mg/dL      Sodium 137 mmol/L      Potassium 2.6 mmol/L      Chloride 99 mmol/L      CO2 22.8 mmol/L      Calcium 10.3 mg/dL      Total Protein 8.6 g/dL      Albumin 4.9 g/dL      ALT (SGPT) 44 U/L      AST (SGOT) 39 U/L      Alkaline Phosphatase 85 U/L      Total Bilirubin 0.2 mg/dL      Globulin 3.7 gm/dL      A/G Ratio 1.3 g/dL      BUN/Creatinine Ratio 11.4     Anion Gap 15.2 mmol/L      eGFR 107.3 mL/min/1.73     Narrative:      GFR Categories in Chronic Kidney Disease (CKD)              GFR  Category          GFR (mL/min/1.73)    Interpretation  G1                    90 or greater        Normal or high (1)  G2                    60-89                Mild decrease (1)  G3a                   45-59                Mild to moderate decrease  G3b                   30-44                Moderate to severe decrease  G4                    15-29                Severe decrease  G5                    14 or less           Kidney failure    (1)In the absence of evidence of kidney disease, neither GFR category G1 or G2 fulfill the criteria for CKD.    eGFR calculation 2021 CKD-EPI creatinine equation, which does not include race as a factor    Lipase [[id number]]  (Abnormal) Collected: 04/30/25 2248    Specimen: Blood Updated: 04/30/25 2333     Lipase 83 U/L     hCG, Quantitative, Pregnancy [[id number]] Collected: 04/30/25 2248    Specimen: Blood Updated: 04/30/25 2331     HCG Quantitative <0.50 mIU/mL     Narrative:      HCG Ranges by Gestational Age    Females - non-pregnant premenopausal   </= 1mIU/mL HCG  Females - postmenopausal               </= 7mIU/mL HCG    3 Weeks       5.4   -      72 mIU/mL  4 Weeks      10.2   -     708 mIU/mL  5 Weeks       217   -   8,245 mIU/mL  6 Weeks       152   -  32,177 mIU/mL  7 Weeks     4,059   - 153,767 mIU/mL  8 Weeks    31,366   - 149,094 mIU/mL  9 Weeks    59,109   - 135,901 mIU/mL  10 Weeks   44,186   - 170,409 mIU/mL  12 Weeks   27,107   - 201,615 mIU/mL  14 Weeks   24,302   -  93,646 mIU/mL  15 Weeks   12,540   -  69,747 mIU/mL  16 Weeks    8,904   -  55,332 mIU/mL  17 Weeks    8,240   -  51,793 mIU/mL  18 Weeks    9,649   -  55,271 mIU/mL      CBC Auto Differential [[id number]]  (Abnormal) Collected: 04/30/25 2248    Specimen: Blood Updated: 04/30/25 2328     WBC 11.44 10*3/mm3      RBC 5.35 10*6/mm3      Hemoglobin 14.9 g/dL      Hematocrit 44.5 %      MCV 83.2 fL      MCH 27.9 pg      MCHC 33.5 g/dL      RDW 13.5 %      RDW-SD 41.0 fl      MPV 11.1 fL      Platelets 289  10*3/mm3      Neutrophil % 39.8 %      Lymphocyte % 50.3 %      Monocyte % 7.0 %      Eosinophil % 2.2 %      Basophil % 0.4 %      Immature Grans % 0.3 %      Neutrophils, Absolute 4.56 10*3/mm3      Lymphocytes, Absolute 5.75 10*3/mm3      Monocytes, Absolute 0.80 10*3/mm3      Eosinophils, Absolute 0.25 10*3/mm3      Basophils, Absolute 0.05 10*3/mm3      Immature Grans, Absolute 0.03 10*3/mm3      nRBC 0.0 /100 WBC     Scan Slide [[id number]]  (Normal) Collected: 04/30/25 2248    Specimen: Blood Updated: 04/30/25 2328     RBC Morphology Normal     WBC Morphology Normal     Platelet Morphology Normal    Ethanol [[id number]] Collected: 04/30/25 2248    Specimen: Blood Updated: 05/01/25 0007     Ethanol <10 mg/dL      Ethanol % <0.010 %     Narrative:      Ethanol (Plasma)  <10 Essentially Negative    Toxic Concentrations           mg/dL    Flushing, slowing of reflexes    Impaired visual activity         Depression of CNS              >100  Possible Coma                  >300       Salicylate Level [[id number]]  (Normal) Collected: 04/30/25 2248    Specimen: Blood Updated: 05/01/25 0007     Salicylate <0.3 mg/dL     Acetaminophen Level [[id number]]  (Normal) Collected: 04/30/25 2248    Specimen: Blood Updated: 05/01/25 0007     Acetaminophen <5.0 mcg/mL     Magnesium [[id number]]  (Normal) Collected: 04/30/25 2248    Specimen: Blood Updated: 05/01/25 0127     Magnesium 1.9 mg/dL     POC Chem Panel [[id number]]  (Abnormal) Collected: 05/01/25 0514    Specimen: Venous Blood Updated: 05/01/25 0516     Glucose 135 mg/dL      Comment: Serial Number: [id number]:  [id number]        Sodium 142 mmol/L      POC Potassium 3.3 mmol/L      Ionized Calcium 1.10 mmol/L      Chloride 109 mmol/L      Creatinine 0.45 mg/dL      BUN 8 mg/dL      CO2 Content 20.7 mmol/L     Blood Gas, Venous - [[id number]]  (Abnormal) Collected: 05/01/25 0514    Specimen: Venous Blood Updated: 05/01/25 0522     pH, Venous 7.440 pH Units       pCO2, Venous 32.0 mm Hg      pO2, Venous 91.8 mm Hg      HCO3, Venous 21.8 mmol/L      Base Excess, Venous -1.5 mmol/L      Comment: Serial Number: [id number]:  [id number]        O2 Saturation, Venous 97.5 %      Hemoglobin, Blood Gas 14.2 g/dL      Barometric Pressure for Blood Gas 739.3000 mmHg      Modality N/A    POC Lactate [[id number]]  (Normal) Collected: 05/01/25 0514    Specimen: Venous Blood Updated: 05/01/25 0516     Lactate 1.3 mmol/L      Comment: Serial Number: [id number]:  [id number]                Imaging:    No Radiology Exams Resulted Within Past 24 Hours      Differential Diagnosis and Discussion:    Abdominal Pain: Based on the patient's signs and symptoms, I considered abdominal aortic aneurysm, small bowel obstruction, pancreatitis, acute cholecystitis, acute appendecitis, peptic ulcer disease, gastritis, colitis, endocrine disorders, irritable bowel syndrome and other differential diagnosis an etiology of the patient's abdominal pain.  Diarrhea: Differential diagnosis includes but is not limited to malabsorption syndrome, bacterial infection, carcinoid syndrome, pancreatic hypersecretion, viral infection, celiac sprue, Crohn's disease, ulcerative colitis, ischemic colitis, colitis, hypermotility, and irritable bowel syndrome.    PROCEDURES:    Labs were collected in the emergency department and all labs were reviewed and interpreted by me.    No orders to display       Procedures    MDM  Number of Diagnoses or Management Options  Diagnosis management comments: Patient presents the emergency department with abdominal cramps and diarrhea.  Labs initially showed potassium of 2.6.  Patient was given potassium.  She also given Bentyl antiemetics.  She described abdominal pain as cramping.  She was given IV fluids.  On reevaluation patient is requesting to go home.  Will give prescription for Bentyl and antiemetics.  Recommend close follow-up with her primary care provider.  Discussed return  precautions, discharge instructions and answered all her questions.       Amount and/or Complexity of Data Reviewed  Clinical lab tests: reviewed  Tests in the radiology section of CPT®: reviewed  Review and summarize past medical records: yes  Independent visualization of images, tracings, or specimens: yes    Risk of Complications, Morbidity, and/or Mortality  Presenting problems: moderate  Management options: moderate                       Patient Care Considerations:    SEPSIS was considered but is NOT present in the emergency department as SIRS criteria is not present.      Consultants/Shared Management Plan:    None    Social Determinants of Health:    Patient is independent, reliable, and has access to care.       Disposition and Care Coordination:    Discharged: The patient is suitable and stable for discharge with no need for consideration of admission.    I have explained the patient´s condition, diagnoses and treatment plan based on the information available to me at this time. I have answered questions and addressed any concerns. The patient has a good  understanding of the patient´s diagnosis, condition, and treatment plan as can be expected at this point. The vital signs have been stable. The patient´s condition is stable and appropriate for discharge from the emergency department.      The patient will pursue further outpatient evaluation with the primary care physician or other designated or consulting physician as outlined in the discharge instructions. They are agreeable to this plan of care and follow-up instructions have been explained in detail. The patient has received these instructions in written format and has expressed an understanding of the discharge instructions. The patient is aware that any significant change in condition or worsening of symptoms should prompt an immediate return to this or the closest emergency department or call to 911.  I have explained discharge medications and the  need for follow up with the patient/caretakers. This was also printed in the discharge instructions. Patient was discharged with the following medications and follow up:      Medication List        New Prescriptions      dicyclomine 20 MG tablet  Commonly known as: BENTYL  Take 1 tablet by mouth Every 6 (Six) Hours.     ondansetron ODT 4 MG disintegrating tablet  Commonly known as: ZOFRAN-ODT  Place 1 tablet on the tongue Every 8 (Eight) Hours As Needed for Nausea.               Where to Get Your Medications        These medications were sent to [facility]/pharmacy #[id number] - [place], [place] - [address] - [phone]  - [phone]  [address] [place] [address]      Hours: 24-hours Phone: [phone]   dicyclomine 20 MG tablet  ondansetron ODT 4 MG disintegrating tablet      Yahaira Lauren, APRN  [address]  [address]  [place] [address]  [phone]    In 2 days         Final diagnoses:   Gastroenteritis        ED Disposition       ED Disposition   Discharge    Condition   Stable    Comment   --               This medical record created using voice recognition software.             Андрей More MD  05/01/25 0602

## 2025-05-01 NOTE — ED NOTES
Pt's significant other reported to the triage RN and the pit provider that the pt took 1 olanzapine (what she told him). She showed up at his workplace very lethargic per significant other. Pt is diaphoretic upon examination and confused. Pt still very lethargic and slow to follow commands. Pit provider advised to broadcast critical care team for possible OD/allergic reaction. Pt reports taking olanzapine for bipolar. Pt denies any other medications/alcohol use. Pts o2 in triage 100%.

## 2025-07-08 NOTE — NON STRESS TEST
--DO NOT REPLY - Sent from PACT - If sent to wrong pool, reroute to [facility] Reroute pool --    Message Type:  Refill Medication   Is the medication pended:Yes  Medication name: amLODIPine (NORVASC) 5 MG tablet   Message:   Preferred pharmacy verified, and selected.  [facility] - [facility] [address]  Call Back #: [phone]  Can a detailed message be left?  Yes - Voicemail   Is the patient OUT of Medication?  Yes: Working Hours: route as HIGH priority according to KB. Patient has been advised the message will be reviewed within 1 business dayCopied from CRM #[id number]. Topic: MW Medication/Rx - MW Rx Refill  >> Jul 8, 2025  3:23 PM Mariya KELLY wrote:  lashaun called to request a medication refill that is Out of medication or is critically low in meds during    Working Hours [facility] Clinical to process refill.   >ADDED NOTE / CREATED CUSTOM CLINICAL CALL  >Reason for call 'Refill Request'  >Sent HIGH priority to [facility] CLIN[facility] MSG POOL [[id number]].   Obstetrical Non-stress Test Interpretation     Name:  Bekah Rubin  MRN: [id number]    24 y.o. female  at 33w3d    Indication: GDM      Baseline: Normal 110-160 bpm  Variability:   Moderate/Normal (amplitude 6-25 bpm)  Accelerations: Present (32 weeks+) 15 x 15 bpm  Decelerations: Absent   Contractions:  Absent       Impression:  Category I       Odalis Lund MD  2024  15:53 EDT